# Patient Record
Sex: FEMALE | Race: WHITE | NOT HISPANIC OR LATINO | Employment: OTHER | URBAN - NONMETROPOLITAN AREA
[De-identification: names, ages, dates, MRNs, and addresses within clinical notes are randomized per-mention and may not be internally consistent; named-entity substitution may affect disease eponyms.]

---

## 2017-02-02 ENCOUNTER — OFFICE VISIT (OUTPATIENT)
Dept: CARDIOLOGY | Facility: CLINIC | Age: 72
End: 2017-02-02

## 2017-02-02 VITALS
DIASTOLIC BLOOD PRESSURE: 74 MMHG | SYSTOLIC BLOOD PRESSURE: 104 MMHG | HEIGHT: 64 IN | HEART RATE: 72 BPM | BODY MASS INDEX: 24.07 KG/M2 | WEIGHT: 141 LBS

## 2017-02-02 DIAGNOSIS — I10 ESSENTIAL HYPERTENSION: ICD-10-CM

## 2017-02-02 DIAGNOSIS — E78.00 HYPERCHOLESTEREMIA: ICD-10-CM

## 2017-02-02 DIAGNOSIS — I25.10 CORONARY ARTERY DISEASE INVOLVING NATIVE CORONARY ARTERY OF NATIVE HEART WITHOUT ANGINA PECTORIS: Primary | ICD-10-CM

## 2017-02-02 DIAGNOSIS — Z79.899 MEDICATION MANAGEMENT: ICD-10-CM

## 2017-02-02 PROCEDURE — 99213 OFFICE O/P EST LOW 20 MIN: CPT | Performed by: NURSE PRACTITIONER

## 2017-02-02 RX ORDER — PRASUGREL 10 MG/1
10 TABLET, FILM COATED ORAL DAILY
Qty: 90 TABLET | Refills: 3 | Status: SHIPPED | OUTPATIENT
Start: 2017-02-02 | End: 2017-05-03

## 2017-02-02 NOTE — PROGRESS NOTES
Chief Complaint   Patient presents with   • Follow-up     denies any cardiac problems.    • Med Refill     refills needed on Effient,  90 days to [facility].    • Labs     Pt brought copy of most recent labs from Aug, to have them rechecked in Feb.        Iker Bravo is a 71 y.o. female history of hypertension, hypercholesterolemia was diagnosed with ischemic heart disease in January 2016 when she presented with chest pain and elevated cardiac enzymes. She was noted to have a significant disease of the diagonal which was stented. Today she comes to the office for a follow up appointment and no cardiac complaints are voiced. She is maintaining her normal activities without problems.     HPI         Cardiac History:    Past Surgical History   Procedure Laterality Date   • Cardiovascular stress test  02/15/2012     Stress- 4 min, 86% THR. BP- 186/82. Septal Ischemia   • Echo - converted  02/24/2012     Echo- EF 65%   • Cardiovascular stress test  12/17/2015     Stress- 8 min 31 sec. 85% THR. Anterior Ischemia   • Echo - converted  12/17/2015     Echo- EF 65%. RVSP- 45 mmHg   • Echo - converted  01/01/2016     Echo- ([facility]. Dr. Marks) EF 65%. RVSP- 36 mmHg   • Cath lab procedure  01/02/2016     Cath- (Dr. Marks)- 99% D1- 2.25x8 JONI       Current Outpatient Prescriptions   Medication Sig Dispense Refill   • acetaminophen (TYLENOL) 325 MG tablet Take 650 mg by mouth every 4 (four) hours as needed for mild pain (1-3).     • alendronate (FOSAMAX) 70 MG tablet Take 70 mg by mouth every 7 days.     • aspirin 81 MG EC tablet Take 81 mg by mouth daily.     • atorvastatin (LIPITOR) 20 MG tablet Take 20 mg by mouth every night.     • carvedilol (COREG) 6.25 MG tablet Take 1 tablet by mouth 2 (two) times a day. 60 tablet 11   • cetirizine (ZyrTEC) 10 MG tablet Take 10 mg by mouth daily.     • cholecalciferol (VITAMIN D3) 1000 UNITS tablet Take 1,000 Units by mouth daily.     • Multiple Vitamin (MULTI  VITAMIN DAILY PO) Take 1 tablet by mouth daily.     • nitroglycerin (NITROSTAT) 0.4 MG SL tablet Place 0.4 mg under the tongue every 5 (five) minutes as needed for chest pain. Take no more than 3 doses in 15 minutes.     • omeprazole (PriLOSEC) 40 MG capsule Take 40 mg by mouth daily.     • prasugrel (EFFIENT) 10 MG tablet Take 1 tablet by mouth Daily for 90 days. 90 tablet 3     No current facility-administered medications for this visit.        Codeine; Compazine [prochlorperazine edisylate]; Penicillins; and Sulfa antibiotics    Past Medical History   Diagnosis Date   • GERD (gastroesophageal reflux disease)    • H/O colonoscopy with polypectomy    • History of back surgery    • History of breast biopsy      benign   • Hypercholesteremia    • Osteoporosis        Social History     Social History   • Marital status:      Spouse name: N/A   • Number of children: N/A   • Years of education: N/A     Occupational History   • Not on file.     Social History Main Topics   • Smoking status: Never Smoker   • Smokeless tobacco: Never Used   • Alcohol use No   • Drug use: No   • Sexual activity: Not on file     Other Topics Concern   • Not on file     Social History Narrative       Family History   Problem Relation Age of Onset   • Heart failure Mother    • Atrial fibrillation Mother    • Heart attack Maternal Grandfather    • Heart attack Other    • Heart attack Brother 68       Review of Systems   Constitutional: Negative for activity change, appetite change, fatigue and fever.   HENT: Negative for congestion, nosebleeds, sinus pressure and trouble swallowing.    Eyes: Negative for visual disturbance.   Respiratory: Negative for cough, shortness of breath and wheezing.    Cardiovascular: Negative for chest pain, palpitations and leg swelling.   Gastrointestinal: Negative for abdominal distention, abdominal pain, blood in stool and nausea.   Endocrine: Negative for polydipsia, polyphagia and polyuria.  "  Genitourinary: Negative for dysuria and hematuria.   Musculoskeletal: Negative for gait problem and myalgias.   Skin: Negative for color change.   Neurological: Negative for dizziness, syncope, speech difficulty, weakness, light-headedness, numbness and headaches.   Hematological: Bruises/bleeds easily.   Psychiatric/Behavioral: Negative for confusion, dysphoric mood and sleep disturbance.       Diabetes- No  Thyroid-normal    Objective     Visit Vitals   • /74   • Pulse 72   • Ht 64\" (162.6 cm)   • Wt 141 lb (64 kg)   • BMI 24.2 kg/m2       Physical Exam   Constitutional: She is oriented to person, place, and time. Vital signs are normal. She appears well-developed.   Eyes: Pupils are equal, round, and reactive to light.   Neck: Neck supple. No JVD present. Carotid bruit is not present.   Cardiovascular: Normal rate, regular rhythm, S1 normal, S2 normal and normal pulses.    Pulmonary/Chest: Effort normal and breath sounds normal.   Abdominal: Soft. Bowel sounds are normal.   Musculoskeletal: She exhibits no edema.   Neurological: She is alert and oriented to person, place, and time.   Skin: Skin is warm and dry.   Psychiatric: She has a normal mood and affect. Her behavior is normal. Thought content normal.   Vitals reviewed.    Procedures      Assessment/Plan      Fidelina was seen today for follow-up, med refill and labs.    Diagnoses and all orders for this visit:    Coronary artery disease involving native coronary artery of native heart without angina pectoris    Hypercholesteremia    Essential hypertension    Medication management    Other orders  -     prasugrel (EFFIENT) 10 MG tablet; Take 1 tablet by mouth Daily for 90 days.      She remains asymptomatic and vital signs are stable. She is tolerating Effient well and co-pay is affordable. We will continue same cardiac medications. If she develops more bruising or co-pay increases we can change to Plavix. She follows with you for management of labs. " July labs show lipids well controlled. I encouraged her on diet and handout information given on lipid lowering diet, including mediterranean diet. I encouraged her to maintain physical activity. We will see her back in 6 months or sooner for problems.             Electronically signed by IZZY Powell,  February 2, 2017 2:41 PM

## 2017-05-16 ENCOUNTER — TELEPHONE (OUTPATIENT)
Dept: CARDIOLOGY | Facility: CLINIC | Age: 72
End: 2017-05-16

## 2017-08-07 ENCOUNTER — OFFICE VISIT (OUTPATIENT)
Dept: CARDIOLOGY | Facility: CLINIC | Age: 72
End: 2017-08-07

## 2017-08-07 VITALS
WEIGHT: 138 LBS | HEART RATE: 60 BPM | BODY MASS INDEX: 23.56 KG/M2 | HEIGHT: 64 IN | DIASTOLIC BLOOD PRESSURE: 70 MMHG | SYSTOLIC BLOOD PRESSURE: 110 MMHG

## 2017-08-07 DIAGNOSIS — K21.9 GASTROESOPHAGEAL REFLUX DISEASE WITHOUT ESOPHAGITIS: ICD-10-CM

## 2017-08-07 DIAGNOSIS — I25.9 IHD (ISCHEMIC HEART DISEASE): Primary | ICD-10-CM

## 2017-08-07 DIAGNOSIS — E78.49 OTHER HYPERLIPIDEMIA: ICD-10-CM

## 2017-08-07 PROBLEM — E78.5 HYPERLIPEMIA: Status: ACTIVE | Noted: 2017-08-07

## 2017-08-07 PROCEDURE — 99213 OFFICE O/P EST LOW 20 MIN: CPT | Performed by: NURSE PRACTITIONER

## 2017-08-07 RX ORDER — PRASUGREL 10 MG/1
10 TABLET, FILM COATED ORAL DAILY
COMMUNITY
End: 2017-08-07 | Stop reason: HOSPADM

## 2017-08-07 RX ORDER — RANITIDINE 150 MG/1
150 TABLET ORAL 2 TIMES DAILY
COMMUNITY
End: 2020-11-17 | Stop reason: ALTCHOICE

## 2017-08-07 NOTE — PROGRESS NOTES
Chief Complaint   Patient presents with   • Follow-up     6 month follow-up, labs and med refills per PCP,  patient brought medication list in with visit.       Cardiac Complaints  none      Subjective   Fidelina Bravo is a 71 y.o. female history of hypertension, hypercholesterolemia was diagnosed with ischemic heart disease in January 2016 when she presented with chest pain and elevated cardiac enzymes. She was noted to have a significant disease of the diagonal which was stented. Today she comes to the office for a follow up appointment and no cardiac complaints are voiced.  She says she stays busy at home without concerns.  She walks about 2 miles a day and states that her walking includes hills which she says she can climb without problems.  Labs and refills she reports with PCP, she will have repeat blood work in one week with him.        Cardiac History  Past Surgical History:   Procedure Laterality Date   • CARDIOVASCULAR STRESS TEST  02/15/2012    Stress- 4 min, 86% THR. BP- 186/82. Septal Ischemia   • CARDIOVASCULAR STRESS TEST  12/17/2015    Stress- 8 min 31 sec. 85% THR. Anterior Ischemia   • CATH LAB PROCEDURE  01/02/2016    Cath- (Dr. Marks)- 99% D1- 2.25x8 JONI   • ECHO - CONVERTED  02/24/2012    Echo- EF 65%   • ECHO - CONVERTED  12/17/2015    Echo- EF 65%. RVSP- 45 mmHg   • ECHO - CONVERTED  01/01/2016    Echo- ([facility]. Dr. Marks) EF 65%. RVSP- 36 mmHg       Current Outpatient Prescriptions   Medication Sig Dispense Refill   • acetaminophen (TYLENOL) 325 MG tablet Take 650 mg by mouth every 4 (four) hours as needed for mild pain (1-3).     • aspirin 81 MG EC tablet Take 81 mg by mouth daily.     • atorvastatin (LIPITOR) 20 MG tablet Take 20 mg by mouth every night.     • carvedilol (COREG) 6.25 MG tablet Take 1 tablet by mouth 2 (two) times a day. 60 tablet 11   • cetirizine (ZyrTEC) 10 MG tablet Take 10 mg by mouth daily.     • cholecalciferol (VITAMIN D3) 1000 UNITS tablet Take 1,000 Units by  mouth daily.     • Multiple Vitamin (MULTI VITAMIN DAILY PO) Take 1 tablet by mouth daily.     • nitroglycerin (NITROSTAT) 0.4 MG SL tablet Place 0.4 mg under the tongue every 5 (five) minutes as needed for chest pain. Take no more than 3 doses in 15 minutes.     • raNITIdine (ZANTAC) 150 MG tablet Take 150 mg by mouth 2 (Two) Times a Day.       No current facility-administered medications for this visit.        Codeine; Compazine [prochlorperazine edisylate]; Penicillins; and Sulfa antibiotics    Past Medical History:   Diagnosis Date   • GERD (gastroesophageal reflux disease)    • H/O colonoscopy with polypectomy    • History of back surgery    • History of breast biopsy     benign   • Hypercholesteremia    • Osteoporosis        Social History     Social History   • Marital status:      Spouse name: N/A   • Number of children: N/A   • Years of education: N/A     Occupational History   • Not on file.     Social History Main Topics   • Smoking status: Never Smoker   • Smokeless tobacco: Never Used   • Alcohol use No   • Drug use: No   • Sexual activity: Not on file     Other Topics Concern   • Not on file     Social History Narrative       Family History   Problem Relation Age of Onset   • Heart failure Mother    • Atrial fibrillation Mother    • Heart attack Maternal Grandfather    • Heart attack Other    • Heart attack Brother 68       Review of Systems   Constitution: Negative for weakness and malaise/fatigue.   Cardiovascular: Negative for chest pain, dyspnea on exertion, irregular heartbeat, leg swelling and palpitations.   Musculoskeletal: Negative for arthritis and back pain.   Gastrointestinal: Negative for anorexia, heartburn and nausea.   Genitourinary: Negative for dysuria, hesitancy and nocturia.   Neurological: Negative for dizziness, focal weakness and light-headedness.   Psychiatric/Behavioral: Negative for altered mental status and depression.       DiabetesNo  Thyroidnormal    Objective  "    /70 (BP Location: Left arm)  Pulse 60  Ht 64\" (162.6 cm)  Wt 138 lb (62.6 kg)  BMI 23.69 kg/m2    Physical Exam   Constitutional: She is oriented to person, place, and time. She appears well-developed and well-nourished.   HENT:   Head: Normocephalic and atraumatic.   Eyes: EOM are normal. Pupils are equal, round, and reactive to light.   Neck: Normal range of motion. Neck supple.   Cardiovascular: Normal rate and regular rhythm.    Pulmonary/Chest: Effort normal and breath sounds normal.   Abdominal: Soft.   Musculoskeletal: Normal range of motion.   Neurological: She is alert and oriented to person, place, and time.   Skin: Skin is warm and dry.   Psychiatric: She has a normal mood and affect. Her behavior is normal.       Procedures    Assessment/Plan     HR and BP are both stable.  We will advise to stop her effient therapy since it has been 18 months since stenting, aspirin therapy will be continued. No new cardiac workup will be advised as no new concerns are voiced and she walks daily without problems. No refills are needed as they are done with you.  Labs are done with you also, she reports more will be done next week, she will bring copy to by our office.  Good cardiac diet and continued daily walking regimen advised. 6 month follow up advised or sooner if needed.      Problems Addressed this Visit        Cardiovascular and Mediastinum    Hyperlipemia    IHD (ischemic heart disease) - Primary       Digestive    Gastroesophageal reflux disease without esophagitis    Relevant Medications    raNITIdine (ZANTAC) 150 MG tablet              Electronically signed by IZZY Olvera August 7, 2017 4:06 PM        "

## 2017-08-24 ENCOUNTER — TELEPHONE (OUTPATIENT)
Dept: CARDIOLOGY | Facility: CLINIC | Age: 72
End: 2017-08-24

## 2017-08-24 RX ORDER — CARVEDILOL 6.25 MG/1
6.25 TABLET ORAL 2 TIMES DAILY
Qty: 60 TABLET | Refills: 11 | Status: SHIPPED | OUTPATIENT
Start: 2017-08-24 | End: 2020-06-26 | Stop reason: DRUGHIGH

## 2017-09-11 ENCOUNTER — APPOINTMENT (OUTPATIENT)
Dept: WOMENS IMAGING | Facility: HOSPITAL | Age: 72
End: 2017-09-11

## 2017-09-11 PROCEDURE — 77063 BREAST TOMOSYNTHESIS BI: CPT | Performed by: RADIOLOGY

## 2017-09-11 PROCEDURE — 77067 SCR MAMMO BI INCL CAD: CPT | Performed by: RADIOLOGY

## 2018-02-08 ENCOUNTER — OFFICE VISIT (OUTPATIENT)
Dept: CARDIOLOGY | Facility: CLINIC | Age: 73
End: 2018-02-08

## 2018-02-08 VITALS
SYSTOLIC BLOOD PRESSURE: 122 MMHG | BODY MASS INDEX: 24.41 KG/M2 | DIASTOLIC BLOOD PRESSURE: 88 MMHG | HEART RATE: 64 BPM | HEIGHT: 64 IN | WEIGHT: 143 LBS

## 2018-02-08 DIAGNOSIS — I25.10 CORONARY ARTERY DISEASE INVOLVING NATIVE CORONARY ARTERY OF NATIVE HEART WITHOUT ANGINA PECTORIS: ICD-10-CM

## 2018-02-08 DIAGNOSIS — I25.9 IHD (ISCHEMIC HEART DISEASE): Primary | ICD-10-CM

## 2018-02-08 DIAGNOSIS — Z79.899 MEDICATION MANAGEMENT: ICD-10-CM

## 2018-02-08 DIAGNOSIS — E78.00 PURE HYPERCHOLESTEROLEMIA: ICD-10-CM

## 2018-02-08 PROCEDURE — 99213 OFFICE O/P EST LOW 20 MIN: CPT | Performed by: NURSE PRACTITIONER

## 2018-02-08 NOTE — PATIENT INSTRUCTIONS
"DASH Eating Plan  DASH stands for \"Dietary Approaches to Stop Hypertension.\" The DASH eating plan is a healthy eating plan that has been shown to reduce high blood pressure (hypertension). Additional health benefits may include reducing the risk of type 2 diabetes mellitus, heart disease, and stroke. The DASH eating plan may also help with weight loss.  What do I need to know about the DASH eating plan?  For the DASH eating plan, you will follow these general guidelines:  · Choose foods with less than 150 milligrams of sodium per serving (as listed on the food label).  · Use salt-free seasonings or herbs instead of table salt or sea salt.  · Check with your health care provider or pharmacist before using salt substitutes.  · Eat lower-sodium products. These are often labeled as \"low-sodium\" or \"no salt added.\"  · Eat fresh foods. Avoid eating a lot of canned foods.  · Eat more vegetables, fruits, and low-fat dairy products.  · Choose whole grains. Look for the word \"whole\" as the first word in the ingredient list.  · Choose fish and skinless chicken or turkey more often than red meat. Limit fish, poultry, and meat to 6 oz (170 g) each day.  · Limit sweets, desserts, sugars, and sugary drinks.  · Choose heart-healthy fats.  · Eat more home-cooked food and less restaurant, buffet, and fast food.  · Limit fried foods.  · Do not beebe foods. Cook foods using methods such as baking, boiling, grilling, and broiling instead.  · When eating at a restaurant, ask that your food be prepared with less salt, or no salt if possible.  What foods can I eat?  Seek help from a dietitian for individual calorie needs.  Grains   Whole grain or whole wheat bread. Brown rice. Whole grain or whole wheat pasta. Quinoa, bulgur, and whole grain cereals. Low-sodium cereals. Corn or whole wheat flour tortillas. Whole grain cornbread. Whole grain crackers. Low-sodium crackers.  Vegetables   Fresh or frozen vegetables (raw, steamed, roasted, or " grilled). Low-sodium or reduced-sodium tomato and vegetable juices. Low-sodium or reduced-sodium tomato sauce and paste. Low-sodium or reduced-sodium canned vegetables.  Fruits   All fresh, canned (in natural juice), or frozen fruits.  Meat and Other Protein Products   Ground beef (85% or leaner), grass-fed beef, or beef trimmed of fat. Skinless chicken or turkey. Ground chicken or turkey. Pork trimmed of fat. All fish and seafood. Eggs. Dried beans, peas, or lentils. Unsalted nuts and seeds. Unsalted canned beans.  Dairy   Low-fat dairy products, such as skim or 1% milk, 2% or reduced-fat cheeses, low-fat ricotta or cottage cheese, or plain low-fat yogurt. Low-sodium or reduced-sodium cheeses.  Fats and Oils   Tub margarines without trans fats. Light or reduced-fat mayonnaise and salad dressings (reduced sodium). Avocado. Safflower, olive, or canola oils. Natural peanut or almond butter.  Other   Unsalted popcorn and pretzels.  The items listed above may not be a complete list of recommended foods or beverages. Contact your dietitian for more options.   What foods are not recommended?  Grains   White bread. White pasta. White rice. Refined cornbread. Bagels and croissants. Crackers that contain trans fat.  Vegetables   Creamed or fried vegetables. Vegetables in a cheese sauce. Regular canned vegetables. Regular canned tomato sauce and paste. Regular tomato and vegetable juices.  Fruits   Canned fruit in light or heavy syrup. Fruit juice.  Meat and Other Protein Products   Fatty cuts of meat. Ribs, chicken wings, lozoya, sausage, bologna, salami, chitterlings, fatback, hot dogs, bratwurst, and packaged luncheon meats. Salted nuts and seeds. Canned beans with salt.  Dairy   Whole or 2% milk, cream, half-and-half, and cream cheese. Whole-fat or sweetened yogurt. Full-fat cheeses or blue cheese. Nondairy creamers and whipped toppings. Processed cheese, cheese spreads, or cheese curds.  Condiments   Onion and garlic  salt, seasoned salt, table salt, and sea salt. Canned and packaged gravies. Worcestershire sauce. Tartar sauce. Barbecue sauce. Teriyaki sauce. Soy sauce, including reduced sodium. Steak sauce. Fish sauce. Oyster sauce. Cocktail sauce. Horseradish. Ketchup and mustard. Meat flavorings and tenderizers. Bouillon cubes. Hot sauce. Tabasco sauce. Marinades. Taco seasonings. Relishes.  Fats and Oils   Butter, stick margarine, lard, shortening, ghee, and lozoya fat. Coconut, palm kernel, or palm oils. Regular salad dressings.  Other   Pickles and olives. Salted popcorn and pretzels.  The items listed above may not be a complete list of foods and beverages to avoid. Contact your dietitian for more information.   Where can I find more information?  [employer] [place]: www.nhlbi.nih.gov/health/health-topics/topics/dash/  This information is not intended to replace advice given to you by your health care provider. Make sure you discuss any questions you have with your health care provider.  Document Released: 12/06/2012 Document Revised: 05/25/2017 Document Reviewed: 10/22/2014  Elsevier Interactive Patient Education © 2017 [employer] Inc[employer]

## 2018-02-08 NOTE — PROGRESS NOTES
Chief Complaint   Patient presents with   • Follow-up     cardiac management, no recent labs, patient brought medication list with visit.        Subjective       Fidelina Bravo is a 72 y.o. female history of hypertension, hypercholesterolemia was diagnosed with ischemic heart disease in January 2016 when she presented with chest pain and elevated cardiac enzymes. She was noted to have a significant disease of the diagonal which was stented.   Today she comes to the office for a follow up visit and denies any reoccurrence of symptoms she experienced prior to stenting. She continues to exercise and maintain active lifestyle. No medication changes reported.   HPI         Cardiac History:    Past Surgical History:   Procedure Laterality Date   • CARDIOVASCULAR STRESS TEST  02/15/2012    Stress- 4 min, 86% THR. BP- 186/82. Septal Ischemia   • CARDIOVASCULAR STRESS TEST  12/17/2015    Stress- 8 min 31 sec. 85% THR. Anterior Ischemia   • CATH LAB PROCEDURE  01/02/2016    Cath- (Dr. Marks)- 99% D1- 2.25x8 JONI   • ECHO - CONVERTED  02/24/2012    Echo- EF 65%   • ECHO - CONVERTED  12/17/2015    Echo- EF 65%. RVSP- 45 mmHg   • ECHO - CONVERTED  01/01/2016    Echo- ([facility]. Dr. Marks) EF 65%. RVSP- 36 mmHg       Current Outpatient Prescriptions   Medication Sig Dispense Refill   • acetaminophen (TYLENOL) 325 MG tablet Take 650 mg by mouth every 4 (four) hours as needed for mild pain (1-3).     • aspirin 81 MG EC tablet Take 81 mg by mouth daily.     • atorvastatin (LIPITOR) 20 MG tablet Take 20 mg by mouth every night.     • carvedilol (COREG) 6.25 MG tablet Take 1 tablet by mouth 2 (Two) Times a Day. 60 tablet 11   • cetirizine (ZyrTEC) 10 MG tablet Take 10 mg by mouth daily.     • cholecalciferol (VITAMIN D3) 1000 UNITS tablet Take 1,000 Units by mouth daily.     • Multiple Vitamin (MULTI VITAMIN DAILY PO) Take 1 tablet by mouth daily.     • nitroglycerin (NITROSTAT) 0.4 MG SL tablet Place 0.4 mg under the tongue every 5  (five) minutes as needed for chest pain. Take no more than 3 doses in 15 minutes.     • raNITIdine (ZANTAC) 150 MG tablet Take 150 mg by mouth 2 (Two) Times a Day.       No current facility-administered medications for this visit.        Codeine; Compazine [prochlorperazine edisylate]; Penicillins; and Sulfa antibiotics    Past Medical History:   Diagnosis Date   • GERD (gastroesophageal reflux disease)    • H/O colonoscopy with polypectomy    • History of back surgery    • History of breast biopsy     benign   • Hypercholesteremia    • Osteoporosis        Social History     Social History   • Marital status:      Spouse name: N/A   • Number of children: N/A   • Years of education: N/A     Occupational History   • Not on file.     Social History Main Topics   • Smoking status: Never Smoker   • Smokeless tobacco: Never Used   • Alcohol use No   • Drug use: No   • Sexual activity: Not on file     Other Topics Concern   • Not on file     Social History Narrative       Family History   Problem Relation Age of Onset   • Heart failure Mother    • Atrial fibrillation Mother    • Heart attack Maternal Grandfather    • Heart attack Other    • Heart attack Brother 68       Review of Systems   Constitution: Negative for decreased appetite, weakness and malaise/fatigue.   HENT: Negative for congestion and hoarse voice.    Cardiovascular: Negative for chest pain, leg swelling and palpitations.   Respiratory: Negative for cough and shortness of breath.    Skin: Negative for color change and itching.   Musculoskeletal: Negative for muscle cramps and myalgias.   Gastrointestinal: Negative for bloating, change in bowel habit and melena.   Genitourinary: Negative for dysuria and hematuria.   Neurological: Negative for dizziness, light-headedness and tremors.   Psychiatric/Behavioral: Negative for altered mental status. The patient does not have insomnia and is not nervous/anxious.    Allergic/Immunologic: Negative for hives.  "       Diabetes- No  Thyroid-normal    Objective     /88 (BP Location: Left arm)  Pulse 64  Ht 162.6 cm (64\")  Wt 64.9 kg (143 lb)  BMI 24.55 kg/m2    Physical Exam   Constitutional: She is oriented to person, place, and time. She appears well-nourished.   HENT:   Head: Normocephalic.   Eyes: Conjunctivae are normal. Pupils are equal, round, and reactive to light.   Neck: Normal range of motion. No JVD present.   Cardiovascular: Normal rate, regular rhythm, S1 normal and S2 normal.    No murmur heard.  Pulses:       Radial pulses are 2+ on the right side, and 2+ on the left side.   Pulmonary/Chest: Effort normal and breath sounds normal. No respiratory distress.   Abdominal: Soft. Bowel sounds are normal.   Musculoskeletal: Normal range of motion. She exhibits no edema.   Neurological: She is alert and oriented to person, place, and time.   Skin: Skin is warm and dry.   Psychiatric: She has a normal mood and affect. Her behavior is normal.      Procedures          Assessment/Plan      Fidelina was seen today for follow-up.    Diagnoses and all orders for this visit:    IHD (ischemic heart disease)    Coronary artery disease involving native coronary artery of native heart without angina pectoris    Pure hypercholesterolemia    Medication management        At next visit will plan to order repeat cardiac workup unless problems develop sooner. Her diastolic blood pressure is slightly higher today which she attributes to not watching diet as closely lately and stress of  recent health issue, bladder cancer. DASH diet information given. She will be seeing you in the near future for labs and follow up. At this time I did not make any medication changes, no refills needed. Her weight is up a few pounds but BMI remains normal. She is taking Lipitor for cholesterol management without problem. Continue the same.   A 6 month follow up scheduled. Please call sooner for problems.            Electronically signed " by Ale Rebollar, APRN,  February 8, 2018 12:24 PM

## 2018-08-09 ENCOUNTER — OFFICE VISIT (OUTPATIENT)
Dept: CARDIOLOGY | Facility: CLINIC | Age: 73
End: 2018-08-09

## 2018-08-09 VITALS
BODY MASS INDEX: 24.07 KG/M2 | HEIGHT: 64 IN | WEIGHT: 141 LBS | DIASTOLIC BLOOD PRESSURE: 88 MMHG | HEART RATE: 60 BPM | SYSTOLIC BLOOD PRESSURE: 142 MMHG

## 2018-08-09 DIAGNOSIS — Z79.899 MEDICATION MANAGEMENT: ICD-10-CM

## 2018-08-09 DIAGNOSIS — I25.118 CORONARY ARTERY DISEASE INVOLVING NATIVE CORONARY ARTERY OF NATIVE HEART WITH OTHER FORM OF ANGINA PECTORIS (HCC): Primary | ICD-10-CM

## 2018-08-09 DIAGNOSIS — Z82.49 FAMILY HISTORY OF ISCHEMIC HEART DISEASE: ICD-10-CM

## 2018-08-09 DIAGNOSIS — I10 ESSENTIAL HYPERTENSION: ICD-10-CM

## 2018-08-09 DIAGNOSIS — I25.9 IHD (ISCHEMIC HEART DISEASE): ICD-10-CM

## 2018-08-09 DIAGNOSIS — E78.00 PURE HYPERCHOLESTEROLEMIA: ICD-10-CM

## 2018-08-09 DIAGNOSIS — R06.02 SHORTNESS OF BREATH: ICD-10-CM

## 2018-08-09 PROCEDURE — 99214 OFFICE O/P EST MOD 30 MIN: CPT | Performed by: NURSE PRACTITIONER

## 2018-08-09 RX ORDER — MELOXICAM 15 MG/1
15 TABLET ORAL AS NEEDED
COMMUNITY

## 2018-08-09 NOTE — PROGRESS NOTES
"Chief Complaint   Patient presents with   • Follow-up     For cardiac management. Will have labs per PCP next month. PCP refills meds.        Subjective       Fidelina Bravo is a 72 y.o. female  history of hypertension, hypercholesterolemia was diagnosed with ischemic heart disease in January 2016 when she presented with chest pain and elevated cardiac enzymes. She was noted to have a significant disease of the diagonal which was stented.  Today she comes to the office for follow up visit. She has been under more stress with  undergoing chemotherapy and illness in family. Blood pressure has been increased lately. She has not had issue with chest pain. Occasionally, she feels a \"catch\" type sensation in her chest. She has been maintaining her normal activities and no recent medication changes reported.     HPI     Cardiac History:    Past Surgical History:   Procedure Laterality Date   • CARDIOVASCULAR STRESS TEST  02/15/2012    Stress- 4 min, 86% THR. BP- 186/82. Septal Ischemia   • CARDIOVASCULAR STRESS TEST  12/17/2015    Stress- 8 min 31 sec. 85% THR. Anterior Ischemia   • CATH LAB PROCEDURE  01/02/2016    Cath- (Dr. Marks)- 99% D1- 2.25x8 JONI   • ECHO - CONVERTED  02/24/2012    Echo- EF 65%   • ECHO - CONVERTED  12/17/2015    Echo- EF 65%. RVSP- 45 mmHg   • ECHO - CONVERTED  01/01/2016    Echo- ([facility]. Dr. Marks) EF 65%. RVSP- 36 mmHg       Current Outpatient Prescriptions   Medication Sig Dispense Refill   • acetaminophen (TYLENOL) 325 MG tablet Take 650 mg by mouth every 4 (four) hours as needed for mild pain (1-3).     • aspirin 81 MG EC tablet Take 81 mg by mouth daily.     • atorvastatin (LIPITOR) 20 MG tablet Take 20 mg by mouth every night.     • carvedilol (COREG) 6.25 MG tablet Take 1 tablet by mouth 2 (Two) Times a Day. 60 tablet 11   • cetirizine (ZyrTEC) 10 MG tablet Take 10 mg by mouth daily.     • cholecalciferol (VITAMIN D3) 1000 UNITS tablet Take 1,000 Units by mouth daily.     • " "meloxicam (MOBIC) 15 MG tablet Take 15 mg by mouth As Needed.     • Multiple Vitamin (MULTI VITAMIN DAILY PO) Take 1 tablet by mouth daily.     • nitroglycerin (NITROSTAT) 0.4 MG SL tablet Place 0.4 mg under the tongue every 5 (five) minutes as needed for chest pain. Take no more than 3 doses in 15 minutes.     • raNITIdine (ZANTAC) 150 MG tablet Take 150 mg by mouth 2 (Two) Times a Day.       No current facility-administered medications for this visit.        Codeine; Compazine [prochlorperazine edisylate]; Penicillins; and Sulfa antibiotics    Past Medical History:   Diagnosis Date   • GERD (gastroesophageal reflux disease)    • H/O colonoscopy with polypectomy    • History of back surgery    • History of breast biopsy     benign   • Hypercholesteremia    • Osteoporosis        Social History     Social History   • Marital status:      Spouse name: N/A   • Number of children: N/A   • Years of education: N/A     Occupational History   • Not on file.     Social History Main Topics   • Smoking status: Never Smoker   • Smokeless tobacco: Never Used   • Alcohol use No   • Drug use: No   • Sexual activity: Not on file     Other Topics Concern   • Not on file     Social History Narrative   • No narrative on file       Family History   Problem Relation Age of Onset   • Heart failure Mother    • Atrial fibrillation Mother    • Heart attack Maternal Grandfather    • Heart attack Other    • Heart attack Brother 68       Review of Systems   HENT: Positive for congestion (allergies). Negative for hoarse voice and nosebleeds.    Eyes: Negative for redness and visual disturbance.   Cardiovascular: Positive for palpitations (rarely).   Respiratory: Positive for shortness of breath (sometimes feel like I have to \"catch my breath\"). Negative for sleep disturbances due to breathing.    Skin: Negative for dry skin and itching.   Genitourinary: Negative for dysuria and hematuria.   Neurological: Negative for dizziness, " "headaches, light-headedness and loss of balance.   Psychiatric/Behavioral: The patient is nervous/anxious (under a lot of family stress). The patient does not have insomnia.         Objective     /88   Pulse 60   Ht 162.6 cm (64\")   Wt 64 kg (141 lb)   BMI 24.20 kg/m²     Physical Exam   Constitutional: She is oriented to person, place, and time. Vital signs are normal. She appears well-developed and well-nourished. She does not appear ill. No distress.   HENT:   Head: Normocephalic.   Eyes: Pupils are equal, round, and reactive to light. Conjunctivae are normal.   Neck: Normal range of motion. Neck supple. Carotid bruit is not present.   Cardiovascular: Normal rate, regular rhythm, S1 normal and S2 normal.    No murmur heard.  Pulmonary/Chest: Effort normal and breath sounds normal.   Abdominal: Soft. Bowel sounds are normal.   Musculoskeletal: Normal range of motion. She exhibits no edema.   Neurological: She is alert and oriented to person, place, and time.   Skin: Skin is warm and dry.   Psychiatric: She has a normal mood and affect.        Procedures: none today      Assessment/Plan      Fidelina was seen today for follow-up.    Diagnoses and all orders for this visit:    Coronary artery disease involving native coronary artery of native heart with other form of angina pectoris (CMS/HCC)  -     Stress Test With Myocardial Perfusion One Day; Future  -     Adult Transthoracic Echo Complete W/ Cont if Necessary Per Protocol; Future    IHD (ischemic heart disease)  -     Stress Test With Myocardial Perfusion One Day; Future  -     Adult Transthoracic Echo Complete W/ Cont if Necessary Per Protocol; Future    Essential hypertension  -     Stress Test With Myocardial Perfusion One Day; Future  -     Adult Transthoracic Echo Complete W/ Cont if Necessary Per Protocol; Future    Pure hypercholesterolemia  -     Stress Test With Myocardial Perfusion One Day; Future  -     Adult Transthoracic Echo Complete W/ " Cont if Necessary Per Protocol; Future    Shortness of breath  -     Stress Test With Myocardial Perfusion One Day; Future  -     Adult Transthoracic Echo Complete W/ Cont if Necessary Per Protocol; Future    Medication management    Family history of ischemic heart disease    Fidelina admits to blood pressure being slightly increased when checked lately. She has developed some mild symptoms suggestive of ischemia. Given her known CAD, risk factors and strong family history of heart disease, repeat stress and echo advised. I did not add a medication at this time. If BP response is increased, consider adding ACE inhibitor.     She continues statin therapy without issue and will follow with you for management of labs.     Patient's Body mass index is 24.2 kg/m². BMI is within normal parameters. No follow-up required. Low fat heart healthy encouraged.      Further recommendations based on test results. A 6 month follow up visit scheduled. Please call sooner for cardiac concerns.            Electronically signed by IZZY Powell,  August 13, 2018 12:13 PM

## 2018-08-15 ENCOUNTER — HOSPITAL ENCOUNTER (OUTPATIENT)
Dept: CARDIOLOGY | Facility: HOSPITAL | Age: 73
Discharge: HOME | End: 2018-08-15

## 2018-08-15 DIAGNOSIS — I25.118 CORONARY ARTERY DISEASE INVOLVING NATIVE CORONARY ARTERY OF NATIVE HEART WITH OTHER FORM OF ANGINA PECTORIS (HCC): ICD-10-CM

## 2018-08-15 DIAGNOSIS — I25.9 IHD (ISCHEMIC HEART DISEASE): ICD-10-CM

## 2018-08-15 DIAGNOSIS — E78.00 PURE HYPERCHOLESTEROLEMIA: ICD-10-CM

## 2018-08-15 DIAGNOSIS — R06.02 SHORTNESS OF BREATH: ICD-10-CM

## 2018-08-15 DIAGNOSIS — I10 ESSENTIAL HYPERTENSION: ICD-10-CM

## 2018-08-15 LAB
MAXIMAL PREDICTED HEART RATE: 150 BPM
MAXIMAL PREDICTED HEART RATE: 150 BPM
STRESS TARGET HR: 128 BPM
STRESS TARGET HR: 128 BPM

## 2018-08-15 PROCEDURE — 78452 HT MUSCLE IMAGE SPECT MULT: CPT

## 2018-08-15 PROCEDURE — A9500 TC99M SESTAMIBI: HCPCS | Performed by: INTERNAL MEDICINE

## 2018-08-15 PROCEDURE — 93306 TTE W/DOPPLER COMPLETE: CPT | Performed by: INTERNAL MEDICINE

## 2018-08-15 PROCEDURE — 93017 CV STRESS TEST TRACING ONLY: CPT

## 2018-08-15 PROCEDURE — 93306 TTE W/DOPPLER COMPLETE: CPT

## 2018-08-15 PROCEDURE — 78452 HT MUSCLE IMAGE SPECT MULT: CPT | Performed by: INTERNAL MEDICINE

## 2018-08-15 PROCEDURE — 0 TECHNETIUM SESTAMIBI: Performed by: INTERNAL MEDICINE

## 2018-08-15 PROCEDURE — 93018 CV STRESS TEST I&R ONLY: CPT | Performed by: INTERNAL MEDICINE

## 2018-08-15 RX ADMIN — TECHNETIUM TC 99M SESTAMIBI 1 DOSE: 1 INJECTION INTRAVENOUS at 08:42

## 2018-08-15 RX ADMIN — TECHNETIUM TC 99M SESTAMIBI 1 DOSE: 1 INJECTION INTRAVENOUS at 08:41

## 2018-08-17 ENCOUNTER — TELEPHONE (OUTPATIENT)
Dept: CARDIOLOGY | Facility: CLINIC | Age: 73
End: 2018-08-17

## 2018-08-17 RX ORDER — ISOSORBIDE MONONITRATE 30 MG/1
30 TABLET, EXTENDED RELEASE ORAL DAILY
Qty: 30 TABLET | Refills: 11 | Status: SHIPPED | OUTPATIENT
Start: 2018-08-17

## 2018-09-15 ENCOUNTER — APPOINTMENT (OUTPATIENT)
Dept: WOMENS IMAGING | Facility: HOSPITAL | Age: 73
End: 2018-09-15

## 2018-09-15 PROCEDURE — 77063 BREAST TOMOSYNTHESIS BI: CPT | Performed by: RADIOLOGY

## 2018-09-15 PROCEDURE — 77067 SCR MAMMO BI INCL CAD: CPT | Performed by: RADIOLOGY

## 2019-02-07 ENCOUNTER — OFFICE VISIT (OUTPATIENT)
Dept: CARDIOLOGY | Facility: CLINIC | Age: 74
End: 2019-02-07

## 2019-02-07 VITALS
SYSTOLIC BLOOD PRESSURE: 106 MMHG | HEART RATE: 60 BPM | WEIGHT: 145 LBS | DIASTOLIC BLOOD PRESSURE: 70 MMHG | HEIGHT: 64 IN | BODY MASS INDEX: 24.75 KG/M2

## 2019-02-07 DIAGNOSIS — I10 ESSENTIAL HYPERTENSION: ICD-10-CM

## 2019-02-07 DIAGNOSIS — Z82.49 FAMILY HISTORY OF ISCHEMIC HEART DISEASE: ICD-10-CM

## 2019-02-07 DIAGNOSIS — E78.00 PURE HYPERCHOLESTEROLEMIA: ICD-10-CM

## 2019-02-07 DIAGNOSIS — I25.9 IHD (ISCHEMIC HEART DISEASE): Primary | ICD-10-CM

## 2019-02-07 PROBLEM — R94.39 ABNORMAL STRESS TEST: Status: ACTIVE | Noted: 2019-02-07

## 2019-02-07 PROCEDURE — 99213 OFFICE O/P EST LOW 20 MIN: CPT | Performed by: NURSE PRACTITIONER

## 2019-02-07 NOTE — PATIENT INSTRUCTIONS
Mediterranean Diet  A Mediterranean diet refers to food and lifestyle choices that are based on the traditions of countries located on the [place]. This way of eating has been shown to help prevent certain conditions and improve outcomes for people who have chronic diseases, like kidney disease and heart disease.  What are tips for following this plan?  Lifestyle  · Cook and eat meals together with your family, when possible.  · Drink enough fluid to keep your urine clear or pale yellow.  · Be physically active every day. This includes:  ? Aerobic exercise like running or swimming.  ? Leisure activities like gardening, walking, or housework.  · Get 7-8 hours of sleep each night.  · If recommended by your health care provider, drink red wine in moderation. This means 1 glass a day for nonpregnant women and 2 glasses a day for men. A glass of wine equals 5 oz (150 mL).  Reading food labels  · Check the serving size of packaged foods. For foods such as rice and pasta, the serving size refers to the amount of cooked product, not dry.  · Check the total fat in packaged foods. Avoid foods that have saturated fat or trans fats.  · Check the ingredients list for added sugars, such as corn syrup.  Shopping  · At the grocery store, buy most of your food from the areas near the walls of the store. This includes:  ? Fresh fruits and vegetables (produce).  ? Grains, beans, nuts, and seeds. Some of these may be available in unpackaged forms or large amounts (in bulk).  ? Fresh seafood.  ? Poultry and eggs.  ? Low-fat dairy products.  · Buy whole ingredients instead of prepackaged foods.  · Buy fresh fruits and vegetables in-season from local farmers markets.  · Buy frozen fruits and vegetables in resealable bags.  · If you do not have access to quality fresh seafood, buy precooked frozen shrimp or canned fish, such as tuna, salmon, or sardines.  · Buy small amounts of raw or cooked vegetables, salads, or olives from the  deli or salad bar at your store.  · Stock your pantry so you always have certain foods on hand, such as olive oil, canned tuna, canned tomatoes, rice, pasta, and beans.  Cooking  · Cook foods with extra-virgin olive oil instead of using butter or other vegetable oils.  · Have meat as a side dish, and have vegetables or grains as your main dish. This means having meat in small portions or adding small amounts of meat to foods like pasta or stew.  · Use beans or vegetables instead of meat in common dishes like chili or lasagna.  · [place] with different cooking methods. Try roasting or broiling vegetables instead of steaming or sautéeing them.  · Add frozen vegetables to soups, stews, pasta, or rice.  · Add nuts or seeds for added healthy fat at each meal. You can add these to yogurt, salads, or vegetable dishes.  · Marinate fish or vegetables using olive oil, lemon juice, garlic, and fresh herbs.  Meal planning  · Plan to eat 1 vegetarian meal one day each week. Try to work up to 2 vegetarian meals, if possible.  · Eat seafood 2 or more times a week.  · Have healthy snacks readily available, such as:  ? Vegetable sticks with hummus.  ? Greek yogurt.  ? Fruit and nut trail mix.  · Eat balanced meals throughout the week. This includes:  ? Fruit: 2-3 servings a day  ? Vegetables: 4-5 servings a day  ? Low-fat dairy: 2 servings a day  ? Fish, poultry, or lean meat: 1 serving a day  ? Beans and legumes: 2 or more servings a week  ? Nuts and seeds: 1-2 servings a day  ? Whole grains: 6-8 servings a day  ? Extra-virgin olive oil: 3-4 servings a day  · Limit red meat and sweets to only a few servings a month  What are my food choices?  · Mediterranean diet  ? Recommended  ? Grains: Whole-grain pasta. Brown rice. Bulgar wheat. Polenta. Couscous. Whole-wheat bread. Oatmeal. Quinoa.  ? Vegetables: Artichokes. Beets. Broccoli. Cabbage. Carrots. Eggplant. Green beans. Chard. Kale. Spinach. Onions. Leeks. Peas. Squash.  Tomatoes. Peppers. Radishes.  ? Fruits: Apples. Apricots. Avocado. Berries. Bananas. Cherries. Dates. Figs. Grapes. Dari. Melon. Oranges. Peaches. Plums. Pomegranate.  ? Meats and other protein foods: Beans. Almonds. Sunflower seeds. Pine nuts. Peanuts. Cod. [place]. Scallops. Shrimp. Tuna. Tilapia. Clams. Oysters. Eggs.  ? Dairy: Low-fat milk. Cheese. Greek yogurt.  ? Beverages: Water. Red wine. Herbal tea.  ? Fats and oils: Extra virgin olive oil. Avocado oil. Grape seed oil.  ? Sweets and desserts: Greek yogurt with honey. Baked apples. Poached pears. Trail mix.  ? Seasoning and other foods: Basil. Cilantro. Coriander. Cumin. Mint. Parsley. Zaki. Rosemary. Tarragon. Garlic. Oregano. Thyme. Pepper. Balsalmic vinegar. Tahini. Hummus. Tomato sauce. Olives. Mushrooms.  ? Limit these  ? Grains: Prepackaged pasta or rice dishes. Prepackaged cereal with added sugar.  ? Vegetables: Deep fried potatoes (french fries).  ? Fruits: Fruit canned in syrup.  ? Meats and other protein foods: Beef. Pork. Lamb. Poultry with skin. Hot dogs. Muhammad.  ? Dairy: Ice cream. Sour cream. Whole milk.  ? Beverages: Juice. Sugar-sweetened soft drinks. Beer. Liquor and spirits.  ? Fats and oils: Butter. Canola oil. Vegetable oil. Beef fat (tallow). Lard.  ? Sweets and desserts: Cookies. Cakes. Pies. Candy.  ? Seasoning and other foods: Mayonnaise. Premade sauces and marinades.  ? The items listed may not be a complete list. Talk with your dietitian about what dietary choices are right for you.  Summary  · The Mediterranean diet includes both food and lifestyle choices.  · Eat a variety of fresh fruits and vegetables, beans, nuts, seeds, and whole grains.  · Limit the amount of red meat and sweets that you eat.  · Talk with your health care provider about whether it is safe for you to drink red wine in moderation. This means 1 glass a day for nonpregnant women and 2 glasses a day for men. A glass of wine equals 5 oz (150 mL).  This information  is not intended to replace advice given to you by your health care provider. Make sure you discuss any questions you have with your health care provider.  Document Released: 08/10/2017 Document Revised: 09/12/2017 Document Reviewed: 08/10/2017  [employer] Interactive Patient Education © 2018 [employer]

## 2019-02-07 NOTE — PROGRESS NOTES
"Chief Complaint   Patient presents with   • Follow-up     Cardiac management. She states \"have a little tingle to left chest, not often\", she feels related to muscle. Has some heaviness to left breast. Last labs in the fall per PCP.       Subjective       Fidelina Bravo is a 73 y.o. female with a history of hypertension, hypercholesterolemia, and IHD diagnosed in January 2016 when she presented with chest pain and elevated cardiac enzymes. She was noted to have a significant disease of the diagonal which was stented. Stress test was repeated August 2018. Small area of anterior wall ischemia noted. Long-acting nitrates added with plan for cath if she developed angina. She came in today for follow up. Tolerating Imdur. She denies any cardiac symptoms. No chest pain, SOB, or palpitations. She occasionally feels \"a little tingle in left breast\" she relates to fibrocystic breast but no angina. She remains active without symptoms. She walks 2-3 miles daily and follows heart healthy diet. Labs are followed by Dr. Pardo. She reports most recent labs were well controlled with improvement in HDL according to her.      HPI         Cardiac History:    Past Surgical History:   Procedure Laterality Date   • CARDIAC CATHETERIZATION  01/02/2016    Dr. Marks)- 99% D1- 2.25x8 JONI   • CARDIOVASCULAR STRESS TEST  02/15/2012    4 min, 86% THR. BP- 186/82. Septal Ischemia   • CARDIOVASCULAR STRESS TEST  12/17/2015    8 min 31 sec. 85% THR. Anterior Ischemia   • CARDIOVASCULAR STRESS TEST  08/15/2018    7 Min. 20 Secs.9.0 METS. 88% THR. BP- 180/83. Small anterior Ischemia.   • ECHO - CONVERTED  02/24/2012    Echo- EF 65%   • ECHO - CONVERTED  12/17/2015    Echo- EF 65%. RVSP- 45 mmHg   • ECHO - CONVERTED  01/01/2016    Echo- ([facility]. Dr. Marks) EF 65%. RVSP- 36 mmHg   • ECHO - CONVERTED  08/15/2018    EF 65%. Mild MR & AI. RVSP- 37 mmHg.       Current Outpatient Medications   Medication Sig Dispense Refill   • acetaminophen (TYLENOL) " 325 MG tablet Take 650 mg by mouth every 4 (four) hours as needed for mild pain (1-3).     • aspirin 81 MG EC tablet Take 81 mg by mouth daily.     • atorvastatin (LIPITOR) 20 MG tablet Take 20 mg by mouth every night.     • carvedilol (COREG) 6.25 MG tablet Take 1 tablet by mouth 2 (Two) Times a Day. 60 tablet 11   • cetirizine (ZyrTEC) 10 MG tablet Take 10 mg by mouth daily.     • cholecalciferol (VITAMIN D3) 1000 UNITS tablet Take 1,000 Units by mouth daily.     • isosorbide mononitrate (IMDUR) 30 MG 24 hr tablet Take 1 tablet by mouth Daily. 30 tablet 11   • meloxicam (MOBIC) 15 MG tablet Take 15 mg by mouth As Needed.     • Multiple Vitamin (MULTI VITAMIN DAILY PO) Take 1 tablet by mouth daily.     • nitroglycerin (NITROSTAT) 0.4 MG SL tablet Place 0.4 mg under the tongue every 5 (five) minutes as needed for chest pain. Take no more than 3 doses in 15 minutes.     • raNITIdine (ZANTAC) 150 MG tablet Take 150 mg by mouth 2 (Two) Times a Day.       No current facility-administered medications for this visit.        Codeine; Compazine [prochlorperazine edisylate]; Sulfa antibiotics; and Penicillins    Past Medical History:   Diagnosis Date   • GERD (gastroesophageal reflux disease)    • H/O colonoscopy with polypectomy    • History of back surgery    • History of breast biopsy     benign   • Hypercholesteremia    • Osteoporosis        Social History     Socioeconomic History   • Marital status:      Spouse name: Not on file   • Number of children: Not on file   • Years of education: Not on file   • Highest education level: Not on file   Social Needs   • Financial resource strain: Not on file   • Food insecurity - worry: Not on file   • Food insecurity - inability: Not on file   • Transportation needs - medical: Not on file   • Transportation needs - non-medical: Not on file   Occupational History   • Not on file   Tobacco Use   • Smoking status: Never Smoker   • Smokeless tobacco: Never Used   Substance and  "Sexual Activity   • Alcohol use: No   • Drug use: No   • Sexual activity: Not on file   Other Topics Concern   • Not on file   Social History Narrative   • Not on file       Family History   Problem Relation Age of Onset   • Heart failure Mother    • Atrial fibrillation Mother    • Heart attack Maternal Grandfather    • Heart attack Other    • Heart attack Brother 68       Review of Systems   Constitution: Positive for weight gain (increased 4 lb). Negative for decreased appetite and weakness.   HENT: Negative.    Eyes: Negative.    Cardiovascular: Negative for chest pain, dyspnea on exertion, leg swelling, orthopnea, palpitations and syncope.   Respiratory: Negative for cough and shortness of breath.    Endocrine: Negative.    Hematologic/Lymphatic: Negative.    Skin: Negative.    Musculoskeletal: Positive for back pain. Negative for falls and myalgias.   Gastrointestinal: Negative for abdominal pain and melena.   Genitourinary: Negative for dysuria and hematuria.   Neurological: Negative for dizziness.   Psychiatric/Behavioral: Negative.  Negative for altered mental status and depression.   Allergic/Immunologic: Negative.       Diabetes- No  Thyroid-normal    Objective     /70 (BP Location: Right arm)   Pulse 60   Ht 162.6 cm (64.02\")   Wt 65.8 kg (145 lb)   BMI 24.88 kg/m²     Physical Exam   Constitutional: She is oriented to person, place, and time. She appears well-developed and well-nourished.   HENT:   Head: Normocephalic.   Eyes: Pupils are equal, round, and reactive to light.   Neck: Normal range of motion.   Cardiovascular: Normal rate, regular rhythm and intact distal pulses.   Pulmonary/Chest: Effort normal and breath sounds normal. No respiratory distress. She has no wheezes.   Abdominal: Soft. Bowel sounds are normal.   Musculoskeletal: Normal range of motion. She exhibits no edema.   Neurological: She is alert and oriented to person, place, and time.   Skin: Skin is warm and dry. She is not " diaphoretic.   Psychiatric: She has a normal mood and affect.   Nursing note and vitals reviewed.    Procedures          Assessment/Plan    Heart rate and blood pressure are stable. Recent stress showing small anterior ischemia reviewed with her. She is well controlled with long-acting nitrates. Continue Imdur. She is aware to contact office with any change in symptoms and cardiac cath will be recommended. Continue aspirin, statin, and beta blocker. Labs are well controlled. Continue daily walking. Heart healthy diet low in sodium, sugar, and saturated fat. Mediterranean diet provided today. She appears stable. We will see her back in six months or sooner as needed.   Fidelina was seen today for follow-up.    Diagnoses and all orders for this visit:    IHD (ischemic heart disease)    Essential hypertension    Pure hypercholesterolemia    Family history of ischemic heart disease        Patient's Body mass index is 24.88 kg/m². BMI is within normal parameters. No follow-up required..               Electronically signed by IZZY Villasenor,  February 7, 2019 9:35 AM

## 2019-08-13 ENCOUNTER — OFFICE VISIT (OUTPATIENT)
Dept: CARDIOLOGY | Facility: CLINIC | Age: 74
End: 2019-08-13

## 2019-08-13 VITALS
SYSTOLIC BLOOD PRESSURE: 122 MMHG | HEART RATE: 64 BPM | DIASTOLIC BLOOD PRESSURE: 80 MMHG | HEIGHT: 64 IN | WEIGHT: 144 LBS | BODY MASS INDEX: 24.59 KG/M2

## 2019-08-13 DIAGNOSIS — E78.2 MIXED HYPERLIPIDEMIA: ICD-10-CM

## 2019-08-13 DIAGNOSIS — I20.9 ANGINAL SYNDROME (HCC): ICD-10-CM

## 2019-08-13 DIAGNOSIS — R94.39 ABNORMAL STRESS TEST: ICD-10-CM

## 2019-08-13 DIAGNOSIS — I10 ESSENTIAL HYPERTENSION: ICD-10-CM

## 2019-08-13 DIAGNOSIS — I25.9 IHD (ISCHEMIC HEART DISEASE): Primary | ICD-10-CM

## 2019-08-13 PROCEDURE — 99214 OFFICE O/P EST MOD 30 MIN: CPT | Performed by: NURSE PRACTITIONER

## 2019-08-13 RX ORDER — NITROGLYCERIN 0.4 MG/1
0.4 TABLET SUBLINGUAL
Qty: 25 TABLET | Refills: 1 | Status: SHIPPED | OUTPATIENT
Start: 2019-08-13

## 2019-08-13 NOTE — PROGRESS NOTES
Chief Complaint   Patient presents with   • Follow-up     For cardiac management. Patient is on aspirin. Reports that she does have some chest pain. Reports that she had gone walking and came back in and bent down to untie shoe and had chest pain that lasted around 15 minutes was short of breawth then, took an aspirin and sat down and then it went away. Last lab work was done on 04/30/19 per PCP, not in chart.    • Med Refill     Needs refills on nitroglycerin to [facility]. PCP has been refilling rest of medications. Brought medication list with visit.        Cardiac Complaints  none      Subjective   Fidelina Bravo is a 73 y.o. female with a history of hypertension, hypercholesterolemia, and IHD diagnosed in January 2016 when she presented with chest pain and elevated cardiac enzymes. She was noted to have a significant disease of the diagonal which was stented. Stress test was repeated August 2018. Small area of anterior wall ischemia noted. Long-acting nitrates added with plan for cath if she developed angina.    She returns today for follow up and reports some issues with angina about 3-4 months ago, that she describes as atypical.  Patient reports she had been walking 2 miles that morning and came back to her house.  She admits to bending over and tying her shoe and noticing a very sharp pain in her chest which then caused shortness of breath.  Patient admits symptoms lasted for about 15 minutes and it was difficult to take a deep breath as it hurt.  She admits that she took ASA but did not take her NTG and pain slowly went away.  She states that after the event she was able to go back to doing her activities and walking at home without concerns.  She does report at this time she was also battling a URI and thinks pleurisy may have been a factor.  She denies any further issues since that time.  Patient does admit to occasional twinge in her chest that she describes as rare and lasts a few seconds and goes  away on its own.  She denies any change in activity and manages to stay busy without concerns. Shortness of breath is denied, pre-syncope/syncope/dizziness denied. Labs she admits are done with PCP, most recent in April.  No copy available for review.  Refills of NTG requested as PCP is monitoring.        Cardiac History  Past Surgical History:   Procedure Laterality Date   • CARDIAC CATHETERIZATION  01/02/2016    Dr. Marks)- 99% D1- 2.25x8 JONI   • CARDIOVASCULAR STRESS TEST  02/15/2012    4 min, 86% THR. BP- 186/82. Septal Ischemia   • CARDIOVASCULAR STRESS TEST  12/17/2015    8 min 31 sec. 85% THR. Anterior Ischemia   • CARDIOVASCULAR STRESS TEST  08/15/2018    7 Min. 20 Secs.9.0 METS. 88% THR. BP- 180/83. Small anterior Ischemia.   • ECHO - CONVERTED  02/24/2012    Echo- EF 65%   • ECHO - CONVERTED  12/17/2015    Echo- EF 65%. RVSP- 45 mmHg   • ECHO - CONVERTED  01/01/2016    Echo- ([facility]. Dr. Marks) EF 65%. RVSP- 36 mmHg   • ECHO - CONVERTED  08/15/2018    EF 65%. Mild MR & AI. RVSP- 37 mmHg.       Current Outpatient Medications   Medication Sig Dispense Refill   • acetaminophen (TYLENOL) 325 MG tablet Take 650 mg by mouth every 4 (four) hours as needed for mild pain (1-3).     • aspirin 81 MG EC tablet Take 81 mg by mouth daily.     • atorvastatin (LIPITOR) 20 MG tablet Take 20 mg by mouth every night.     • carvedilol (COREG) 6.25 MG tablet Take 1 tablet by mouth 2 (Two) Times a Day. 60 tablet 11   • cetirizine (ZyrTEC) 10 MG tablet Take 10 mg by mouth daily.     • cholecalciferol (VITAMIN D3) 1000 UNITS tablet Take 1,000 Units by mouth daily.     • isosorbide mononitrate (IMDUR) 30 MG 24 hr tablet Take 1 tablet by mouth Daily. 30 tablet 11   • meloxicam (MOBIC) 15 MG tablet Take 15 mg by mouth As Needed.     • Multiple Vitamin (MULTI VITAMIN DAILY PO) Take 1 tablet by mouth daily.     • nitroglycerin (NITROSTAT) 0.4 MG SL tablet Place 1 tablet under the tongue Every 5 (Five) Minutes As Needed for Chest  "Pain. Take no more than 3 doses in 15 minutes. 25 tablet 1   • raNITIdine (ZANTAC) 150 MG tablet Take 150 mg by mouth 2 (Two) Times a Day.       No current facility-administered medications for this visit.        Codeine; Compazine [prochlorperazine edisylate]; Sulfa antibiotics; and Penicillins    Past Medical History:   Diagnosis Date   • GERD (gastroesophageal reflux disease)    • H/O colonoscopy with polypectomy    • History of back surgery    • History of breast biopsy     benign   • Hypercholesteremia    • Osteoporosis        Social History     Socioeconomic History   • Marital status:      Spouse name: Not on file   • Number of children: Not on file   • Years of education: Not on file   • Highest education level: Not on file   Tobacco Use   • Smoking status: Never Smoker   • Smokeless tobacco: Never Used   Substance and Sexual Activity   • Alcohol use: No   • Drug use: No       Family History   Problem Relation Age of Onset   • Heart failure Mother    • Atrial fibrillation Mother    • Heart attack Maternal Grandfather    • Heart attack Other    • Heart attack Brother 68       Review of Systems   Constitution: Negative for weakness.   Cardiovascular: Negative for chest pain, claudication, dyspnea on exertion, irregular heartbeat, near-syncope, orthopnea, palpitations and syncope.        Chest pain 3 months ago with URI, nothing since   Respiratory: Negative for cough, shortness of breath and wheezing.    Musculoskeletal: Negative for back pain, joint pain and joint swelling.   Gastrointestinal: Negative for anorexia, heartburn, nausea and vomiting.   Genitourinary: Negative for dysuria, hematuria, hesitancy and nocturia.   Neurological: Negative for dizziness, light-headedness and loss of balance.   Psychiatric/Behavioral: Negative for depression and memory loss. The patient is not nervous/anxious.            Objective     /80 (BP Location: Left arm)   Pulse 64   Ht 162.6 cm (64.02\")   Wt 65.3 " kg (144 lb)   BMI 24.71 kg/m²     Physical Exam   Constitutional: She is oriented to person, place, and time. She appears well-developed and well-nourished.   HENT:   Head: Normocephalic and atraumatic.   Eyes: EOM are normal. Pupils are equal, round, and reactive to light.   Neck: Normal range of motion. Neck supple.   Cardiovascular: Normal rate and regular rhythm.   Murmur heard.  Pulmonary/Chest: Effort normal and breath sounds normal.   Abdominal: Soft.   Musculoskeletal: Normal range of motion.   Neurological: She is alert and oriented to person, place, and time.   Skin: Skin is warm and dry.   Psychiatric: She has a normal mood and affect. Her behavior is normal.       Procedures    Assessment/Plan     HR and BP are both stable today.  HTN well managed on current, same advised. Angina well managed on current imdur therapy as no chest pain reported over 3-4 months.  Patient does admit to an atypical chest pain 3 months ago with shortness of breath but admits to a bout of bronchitis, description sounds more like a pleurisy.  She was urged to call with any more chest discomfort so cardiac cath can be scheduled.  Patient urged to use SL NTG if needed for pain.  ASA therapy continued for history of IHD.  No recent labs are available but she states you are managing FLP and lipitor therapy.  For now, current dosing continued.  Could we have most recent for review?  No refills needed as she reports with your office. Weight is stable with BMI noted at 24.71.  Patient urged to maintain good cardiac diet and walking regimen of about 1-2 miles per day.  6 month follow up scheduled or sooner if needed.  Patient urged to call for concerns.            Problems Addressed this Visit        Cardiovascular and Mediastinum    Hyperlipemia    IHD (ischemic heart disease) - Primary    Relevant Medications    nitroglycerin (NITROSTAT) 0.4 MG SL tablet    Essential hypertension    Abnormal stress test      Other Visit Diagnoses      Anginal syndrome (CMS/HCC)        Relevant Medications    nitroglycerin (NITROSTAT) 0.4 MG SL tablet          Patient's Body mass index is 24.71 kg/m². BMI is within normal parameters. No follow-up required..                Electronically signed by IZZY Olvera August 13, 2019 11:59 AM

## 2019-09-24 ENCOUNTER — APPOINTMENT (OUTPATIENT)
Dept: WOMENS IMAGING | Facility: HOSPITAL | Age: 74
End: 2019-09-24

## 2019-09-24 PROCEDURE — 77063 BREAST TOMOSYNTHESIS BI: CPT | Performed by: RADIOLOGY

## 2019-09-24 PROCEDURE — 77067 SCR MAMMO BI INCL CAD: CPT | Performed by: RADIOLOGY

## 2020-02-24 ENCOUNTER — OFFICE VISIT (OUTPATIENT)
Dept: CARDIOLOGY | Facility: CLINIC | Age: 75
End: 2020-02-24

## 2020-02-24 VITALS
WEIGHT: 146 LBS | BODY MASS INDEX: 24.92 KG/M2 | SYSTOLIC BLOOD PRESSURE: 110 MMHG | HEART RATE: 72 BPM | HEIGHT: 64 IN | DIASTOLIC BLOOD PRESSURE: 78 MMHG

## 2020-02-24 DIAGNOSIS — R94.39 ABNORMAL STRESS TEST: ICD-10-CM

## 2020-02-24 DIAGNOSIS — E78.2 MIXED HYPERLIPIDEMIA: ICD-10-CM

## 2020-02-24 DIAGNOSIS — I25.9 IHD (ISCHEMIC HEART DISEASE): ICD-10-CM

## 2020-02-24 DIAGNOSIS — I10 ESSENTIAL HYPERTENSION: Primary | ICD-10-CM

## 2020-02-24 PROCEDURE — 99213 OFFICE O/P EST LOW 20 MIN: CPT | Performed by: NURSE PRACTITIONER

## 2020-02-24 NOTE — PROGRESS NOTES
Chief Complaint   Patient presents with   • Follow-up     Cardiac management.   • Lab     Last labs in chart. PCP writes refills on medication.   • Chest Pain     Only had one episode of sharp pain after coughing when she was bending over, short in duration.       Iker Bravo is a 74 y.o. female with hypertension, hypercholesterolemia, and IHD diagnosed in January 2016 when she presented with chest pain and elevated cardiac enzymes. She was noted to have a significant disease of the diagonal which was stented. Stress test was repeated August 2018. Small area of anterior wall ischemia noted. Long-acting nitrates added with plan for cath if she developed angina. She came today for follow up. Overall, feeling well. Maintaining regular activities without symptoms. She had an isolated episode of sharp chest pain which occurred about 3-4 weeks ago when she was bending over. The pain subsided when she raised up. She associated the pain with URI, coughing which has now resolved. No recurrent episodes. Labs and refills are followed by Dr. Pardo. On 9/26/19: , LDL 95, HDL 39, Tri 80, glucose 96, GFR 68, AST 47.     HPI         Cardiac History:    Past Surgical History:   Procedure Laterality Date   • CARDIAC CATHETERIZATION  01/02/2016    Dr. Marks)- 99% D1- 2.25x8 JONI   • CARDIOVASCULAR STRESS TEST  02/15/2012    4 min, 86% THR. BP- 186/82. Septal Ischemia   • CARDIOVASCULAR STRESS TEST  12/17/2015    8 min 31 sec. 85% THR. Anterior Ischemia   • CARDIOVASCULAR STRESS TEST  08/15/2018    7 Min. 20 Secs.9.0 METS. 88% THR. BP- 180/83. Small anterior Ischemia.   • ECHO - CONVERTED  02/24/2012    Echo- EF 65%   • ECHO - CONVERTED  12/17/2015    Echo- EF 65%. RVSP- 45 mmHg   • ECHO - CONVERTED  01/01/2016    Echo- ([facility]. Dr. Marks) EF 65%. RVSP- 36 mmHg   • ECHO - CONVERTED  08/15/2018    EF 65%. Mild MR & AI. RVSP- 37 mmHg.     Current Outpatient Medications   Medication Sig Dispense Refill   •  acetaminophen (TYLENOL) 325 MG tablet Take 650 mg by mouth every 4 (four) hours as needed for mild pain (1-3).     • aspirin 81 MG EC tablet Take 81 mg by mouth daily.     • atorvastatin (LIPITOR) 20 MG tablet Take 20 mg by mouth every night.     • carvedilol (COREG) 6.25 MG tablet Take 1 tablet by mouth 2 (Two) Times a Day. 60 tablet 11   • cetirizine (ZyrTEC) 10 MG tablet Take 10 mg by mouth daily.     • cholecalciferol (VITAMIN D3) 1000 UNITS tablet Take 1,000 Units by mouth daily.     • isosorbide mononitrate (IMDUR) 30 MG 24 hr tablet Take 1 tablet by mouth Daily. 30 tablet 11   • meloxicam (MOBIC) 15 MG tablet Take 15 mg by mouth As Needed.     • Multiple Vitamin (MULTI VITAMIN DAILY PO) Take 1 tablet by mouth daily.     • nitroglycerin (NITROSTAT) 0.4 MG SL tablet Place 1 tablet under the tongue Every 5 (Five) Minutes As Needed for Chest Pain. Take no more than 3 doses in 15 minutes. 25 tablet 1   • raNITIdine (ZANTAC) 150 MG tablet Take 150 mg by mouth 2 (Two) Times a Day.       No current facility-administered medications for this visit.      Compazine [prochlorperazine edisylate]; Codeine; Penicillins; and Sulfa antibiotics    Past Medical History:   Diagnosis Date   • GERD (gastroesophageal reflux disease)    • H/O colonoscopy with polypectomy    • History of back surgery    • History of breast biopsy     benign   • Hypercholesteremia    • Osteoporosis        Social History     Socioeconomic History   • Marital status:      Spouse name: Not on file   • Number of children: Not on file   • Years of education: Not on file   • Highest education level: Not on file   Tobacco Use   • Smoking status: Never Smoker   • Smokeless tobacco: Never Used   Substance and Sexual Activity   • Alcohol use: No   • Drug use: No       Family History   Problem Relation Age of Onset   • Heart failure Mother    • Atrial fibrillation Mother    • Heart attack Maternal Grandfather    • Heart attack Other    • Heart attack  "Brother 68       Review of Systems   Constitution: Positive for weight gain (up 2 lb). Negative for decreased appetite and malaise/fatigue.   HENT: Negative.    Cardiovascular: Negative for chest pain, dyspnea on exertion, leg swelling, orthopnea, palpitations and syncope.   Respiratory: Negative for cough (resolved now ) and shortness of breath.    Endocrine: Negative.    Hematologic/Lymphatic: Negative.    Skin: Negative.    Musculoskeletal: Negative for falls and myalgias.   Gastrointestinal: Negative for abdominal pain and melena.   Genitourinary: Negative for dysuria and hematuria.   Neurological: Negative for dizziness.   Psychiatric/Behavioral: Negative for altered mental status and depression.   Allergic/Immunologic: Negative.       Diabetes- No  Thyroid-normal    Objective     /78 (BP Location: Left arm)   Pulse 72   Ht 162.6 cm (64.02\")   Wt 66.2 kg (146 lb)   BMI 25.05 kg/m²     Physical Exam   Constitutional: She is oriented to person, place, and time. She appears well-developed and well-nourished. No distress.   HENT:   Head: Normocephalic.   Eyes: Pupils are equal, round, and reactive to light.   Neck: Normal range of motion.   Cardiovascular: Normal rate, regular rhythm and intact distal pulses.   Pulmonary/Chest: Effort normal and breath sounds normal.   Abdominal: Soft. Bowel sounds are normal.   Musculoskeletal: Normal range of motion. She exhibits no edema.   Neurological: She is alert and oriented to person, place, and time.   Skin: Skin is warm and dry. She is not diaphoretic.   Psychiatric: She has a normal mood and affect.   Nursing note and vitals reviewed.     Procedures          Assessment/Plan      Fidelina was seen today for follow-up, lab and chest pain.    Diagnoses and all orders for this visit:    Essential hypertension    Mixed hyperlipidemia    IHD (ischemic heart disease)    Abnormal stress test    1. HTN- well controlled. Continue Coreg, Imdur. Limit sodium 1500 mg daily. " Continue regular exercise. BMI normal at 25.     2. Hyperlipidemia- stable, LDL slightly above goal. Increased from 70 to 95. Continue atorvastatin 20 mg along with heart healthy, Mediterranean style diet. Limit sugars and carbohydrates. Thank you for sending labs.     3. IHD- s/p stenting of diagonal-1, 2016; nuclear stress- small anterior ischemia, 8/2018, added Imdur. She has no recurrent anginal type symptoms. Reviewed her cardiac work up with her. Will continue to monitor only. She is encouraged to report any new or worsening symptoms. Continue aspirin, BB, statin.     Follow up in six months or sooner if needed.     Patient's Body mass index is 25.05 kg/m². BMI is within normal parameters. No follow-up required..               Electronically signed by IZZY Villasenor,  February 24, 2020 11:06 AM

## 2020-06-22 ENCOUNTER — TELEPHONE (OUTPATIENT)
Dept: CARDIOLOGY | Facility: CLINIC | Age: 75
End: 2020-06-22

## 2020-06-22 DIAGNOSIS — R55 SYNCOPE AND COLLAPSE: ICD-10-CM

## 2020-06-22 DIAGNOSIS — I25.9 IHD (ISCHEMIC HEART DISEASE): Primary | ICD-10-CM

## 2020-06-22 DIAGNOSIS — R94.39 ABNORMAL STRESS TEST: ICD-10-CM

## 2020-06-22 DIAGNOSIS — R06.02 SHORTNESS OF BREATH: ICD-10-CM

## 2020-06-22 DIAGNOSIS — E78.2 MIXED HYPERLIPIDEMIA: ICD-10-CM

## 2020-06-22 DIAGNOSIS — I20.8 STABLE ANGINA PECTORIS (HCC): ICD-10-CM

## 2020-06-22 DIAGNOSIS — I34.0 NONRHEUMATIC MITRAL VALVE REGURGITATION: ICD-10-CM

## 2020-06-22 DIAGNOSIS — I10 ESSENTIAL HYPERTENSION: ICD-10-CM

## 2020-06-22 NOTE — TELEPHONE ENCOUNTER
Recommend repeat cardiac work up.     Stress test, echo, carotid US. May need holter if work up is normal.     I will place orders. Does she think she can walk on treadmill? I ordered walking.

## 2020-06-22 NOTE — TELEPHONE ENCOUNTER
Patient was made aware of orders for stress, echo, and carotid US. She states that she feels like she can walk on treadmill.

## 2020-06-22 NOTE — TELEPHONE ENCOUNTER
Patient called reporting that yesterday she was at [employer] and started sweating, got dizzy, blurry eyed, and then passed out. She states that the [employer] was cool. She states that she did not go to ER. When she got home from [employer], she took BP and it was 119/76 and HR was 77.    She states that she has felt fine today. She states that she is still taking coreg 6.25 mg BID, aspirin 81 mg daily, and imdur 30 mg daily.     She states that PCP did do lab work a few weeks ago, I can call to obtain.

## 2020-06-25 ENCOUNTER — HOSPITAL ENCOUNTER (OUTPATIENT)
Dept: CARDIOLOGY | Facility: HOSPITAL | Age: 75
Discharge: HOME | End: 2020-06-25

## 2020-06-25 VITALS — BODY MASS INDEX: 24.92 KG/M2 | HEIGHT: 64 IN | WEIGHT: 145.94 LBS

## 2020-06-25 DIAGNOSIS — R06.02 SHORTNESS OF BREATH: ICD-10-CM

## 2020-06-25 DIAGNOSIS — R94.39 ABNORMAL STRESS TEST: ICD-10-CM

## 2020-06-25 DIAGNOSIS — I25.9 IHD (ISCHEMIC HEART DISEASE): ICD-10-CM

## 2020-06-25 DIAGNOSIS — I20.8 STABLE ANGINA PECTORIS (HCC): ICD-10-CM

## 2020-06-25 DIAGNOSIS — R55 SYNCOPE AND COLLAPSE: ICD-10-CM

## 2020-06-25 DIAGNOSIS — I34.0 NONRHEUMATIC MITRAL VALVE REGURGITATION: ICD-10-CM

## 2020-06-25 DIAGNOSIS — E78.2 MIXED HYPERLIPIDEMIA: ICD-10-CM

## 2020-06-25 DIAGNOSIS — I10 ESSENTIAL HYPERTENSION: ICD-10-CM

## 2020-06-25 LAB
AORTIC DIMENSIONLESS INDEX: 0.7 (DI)
BH CV ECHO MEAS - ACS: 1.7 CM
BH CV ECHO MEAS - AI DEC SLOPE: 195 CM/SEC^2
BH CV ECHO MEAS - AI MAX PG: 53.9 MMHG
BH CV ECHO MEAS - AI MAX VEL: 367 CM/SEC
BH CV ECHO MEAS - AI P1/2T: 551.2 MSEC
BH CV ECHO MEAS - AO MAX PG (FULL): 3.5 MMHG
BH CV ECHO MEAS - AO MAX PG: 6.4 MMHG
BH CV ECHO MEAS - AO MEAN PG (FULL): 2 MMHG
BH CV ECHO MEAS - AO MEAN PG: 3 MMHG
BH CV ECHO MEAS - AO ROOT AREA (BSA CORRECTED): 1.8
BH CV ECHO MEAS - AO ROOT AREA: 7.1 CM^2
BH CV ECHO MEAS - AO ROOT DIAM: 3 CM
BH CV ECHO MEAS - AO V2 MAX: 126 CM/SEC
BH CV ECHO MEAS - AO V2 MEAN: 81.5 CM/SEC
BH CV ECHO MEAS - AO V2 VTI: 31.4 CM
BH CV ECHO MEAS - BSA(HAYCOCK): 1.7 M^2
BH CV ECHO MEAS - BSA: 1.7 M^2
BH CV ECHO MEAS - BZI_BMI: 25.1 KILOGRAMS/M^2
BH CV ECHO MEAS - BZI_METRIC_HEIGHT: 162.6 CM
BH CV ECHO MEAS - BZI_METRIC_WEIGHT: 66.2 KG
BH CV ECHO MEAS - EDV(CUBED): 74.6 ML
BH CV ECHO MEAS - EDV(TEICH): 79 ML
BH CV ECHO MEAS - EF(CUBED): 74.2 %
BH CV ECHO MEAS - EF(TEICH): 66.4 %
BH CV ECHO MEAS - ESV(CUBED): 19.2 ML
BH CV ECHO MEAS - ESV(TEICH): 26.5 ML
BH CV ECHO MEAS - FS: 36.3 %
BH CV ECHO MEAS - IVS/LVPW: 0.9
BH CV ECHO MEAS - IVSD: 0.97 CM
BH CV ECHO MEAS - LA DIMENSION(2D): 4.3 CM
BH CV ECHO MEAS - LA DIMENSION: 4.2 CM
BH CV ECHO MEAS - LA/AO: 1.4
BH CV ECHO MEAS - LAT PEAK E' VEL: 8.8 CM/SEC
BH CV ECHO MEAS - LV IVRT: 0.09 SEC
BH CV ECHO MEAS - LV MASS(C)D: 143 GRAMS
BH CV ECHO MEAS - LV MASS(C)DI: 83.6 GRAMS/M^2
BH CV ECHO MEAS - LV MAX PG: 2.9 MMHG
BH CV ECHO MEAS - LV MEAN PG: 1 MMHG
BH CV ECHO MEAS - LV V1 MAX: 84.9 CM/SEC
BH CV ECHO MEAS - LV V1 MEAN: 53.3 CM/SEC
BH CV ECHO MEAS - LV V1 VTI: 20.9 CM
BH CV ECHO MEAS - LVIDD: 4.2 CM
BH CV ECHO MEAS - LVIDS: 2.7 CM
BH CV ECHO MEAS - LVPWD: 1.1 CM
BH CV ECHO MEAS - MED PEAK E' VEL: 8.5 CM/SEC
BH CV ECHO MEAS - MV A MAX VEL: 69.8 CM/SEC
BH CV ECHO MEAS - MV DEC SLOPE: 491 CM/SEC^2
BH CV ECHO MEAS - MV DEC TIME: 0.22 SEC
BH CV ECHO MEAS - MV E MAX VEL: 91.2 CM/SEC
BH CV ECHO MEAS - MV E/A: 1.3
BH CV ECHO MEAS - MV MAX PG: 3.7 MMHG
BH CV ECHO MEAS - MV MEAN PG: 1 MMHG
BH CV ECHO MEAS - MV P1/2T MAX VEL: 112 CM/SEC
BH CV ECHO MEAS - MV P1/2T: 66.8 MSEC
BH CV ECHO MEAS - MV V2 MAX: 96.1 CM/SEC
BH CV ECHO MEAS - MV V2 MEAN: 48.5 CM/SEC
BH CV ECHO MEAS - MV V2 VTI: 28.3 CM
BH CV ECHO MEAS - MVA P1/2T LCG: 2 CM^2
BH CV ECHO MEAS - MVA(P1/2T): 3.3 CM^2
BH CV ECHO MEAS - PA MAX PG (FULL): 0.31 MMHG
BH CV ECHO MEAS - PA MAX PG: 3.1 MMHG
BH CV ECHO MEAS - PA MEAN PG (FULL): 0 MMHG
BH CV ECHO MEAS - PA MEAN PG: 2 MMHG
BH CV ECHO MEAS - PA V2 MAX: 87.5 CM/SEC
BH CV ECHO MEAS - PA V2 MEAN: 64.1 CM/SEC
BH CV ECHO MEAS - PA V2 VTI: 24 CM
BH CV ECHO MEAS - PI END-D VEL: 113 CM/SEC
BH CV ECHO MEAS - RV MAX PG: 2.7 MMHG
BH CV ECHO MEAS - RV MEAN PG: 2 MMHG
BH CV ECHO MEAS - RV V1 MAX: 82.9 CM/SEC
BH CV ECHO MEAS - RV V1 MEAN: 60.5 CM/SEC
BH CV ECHO MEAS - RV V1 VTI: 26.1 CM
BH CV ECHO MEAS - RVDD: 2.7 CM
BH CV ECHO MEAS - SI(AO): 129.7 ML/M^2
BH CV ECHO MEAS - SI(CUBED): 32.4 ML/M^2
BH CV ECHO MEAS - SI(TEICH): 30.7 ML/M^2
BH CV ECHO MEAS - SV(AO): 222 ML
BH CV ECHO MEAS - SV(CUBED): 55.4 ML
BH CV ECHO MEAS - SV(TEICH): 52.5 ML
BH CV ECHO MEASUREMENTS AVERAGE E/E' RATIO: 10.54
BH CV NUCLEAR PRIOR STUDY: 3
BH CV STRESS DURATION MIN STAGE 1: 3
BH CV STRESS DURATION SEC STAGE 1: 0
BH CV STRESS GRADE STAGE 1: 10
BH CV STRESS METS STAGE 1: 5
BH CV STRESS PROTOCOL 1: NORMAL
BH CV STRESS RECOVERY BP: NORMAL MMHG
BH CV STRESS RECOVERY HR: 71 BPM
BH CV STRESS SPEED STAGE 1: 1.7
BH CV STRESS STAGE 1: 1
LV EF NUC BP: 72 %
MAXIMAL PREDICTED HEART RATE: 148 BPM
MAXIMAL PREDICTED HEART RATE: 148 BPM
PERCENT MAX PREDICTED HR: 87.84 %
STRESS BASELINE BP: NORMAL MMHG
STRESS BASELINE HR: 56 BPM
STRESS PERCENT HR: 103 %
STRESS POST ESTIMATED WORKLOAD: 7 METS
STRESS POST EXERCISE DUR MIN: 6 MIN
STRESS POST PEAK BP: NORMAL MMHG
STRESS POST PEAK HR: 130 BPM
STRESS TARGET HR: 126 BPM
STRESS TARGET HR: 126 BPM

## 2020-06-25 PROCEDURE — 93306 TTE W/DOPPLER COMPLETE: CPT | Performed by: INTERNAL MEDICINE

## 2020-06-25 PROCEDURE — 93356 MYOCRD STRAIN IMG SPCKL TRCK: CPT | Performed by: INTERNAL MEDICINE

## 2020-06-25 PROCEDURE — 0 TECHNETIUM SESTAMIBI: Performed by: INTERNAL MEDICINE

## 2020-06-25 PROCEDURE — 93016 CV STRESS TEST SUPVJ ONLY: CPT | Performed by: NURSE PRACTITIONER

## 2020-06-25 PROCEDURE — A9500 TC99M SESTAMIBI: HCPCS | Performed by: INTERNAL MEDICINE

## 2020-06-25 PROCEDURE — 93880 EXTRACRANIAL BILAT STUDY: CPT | Performed by: RADIOLOGY

## 2020-06-25 PROCEDURE — 93306 TTE W/DOPPLER COMPLETE: CPT

## 2020-06-25 PROCEDURE — 93018 CV STRESS TEST I&R ONLY: CPT | Performed by: INTERNAL MEDICINE

## 2020-06-25 PROCEDURE — 93880 EXTRACRANIAL BILAT STUDY: CPT

## 2020-06-25 PROCEDURE — 78452 HT MUSCLE IMAGE SPECT MULT: CPT | Performed by: INTERNAL MEDICINE

## 2020-06-25 PROCEDURE — 93017 CV STRESS TEST TRACING ONLY: CPT

## 2020-06-25 PROCEDURE — 78452 HT MUSCLE IMAGE SPECT MULT: CPT

## 2020-06-25 RX ADMIN — TECHNETIUM TC 99M SESTAMIBI 1 DOSE: 1 INJECTION INTRAVENOUS at 08:21

## 2020-06-25 RX ADMIN — TECHNETIUM TC 99M SESTAMIBI 1 DOSE: 1 INJECTION INTRAVENOUS at 08:20

## 2020-06-26 ENCOUNTER — TELEPHONE (OUTPATIENT)
Dept: CARDIOLOGY | Facility: CLINIC | Age: 75
End: 2020-06-26

## 2020-06-26 RX ORDER — CARVEDILOL 12.5 MG/1
12.5 TABLET ORAL 2 TIMES DAILY
Qty: 180 TABLET | Refills: 1 | Status: SHIPPED | OUTPATIENT
Start: 2020-06-26 | End: 2020-11-02

## 2020-06-26 NOTE — TELEPHONE ENCOUNTER
----- Message from IZZY Villasenor sent at 6/26/2020  8:18 AM EDT -----  See notes on stress- normal pictures, good blood flow.     Mild hypertensive and heart rate response. Max bp 180/84. Recommended increasing Coreg to 12.5 mg BID

## 2020-06-26 NOTE — TELEPHONE ENCOUNTER
Patient aware, see stress test results. Script for Coreg 12.5mg bid sent to [facility] mail order pharmacy per patient request.

## 2020-09-28 ENCOUNTER — APPOINTMENT (OUTPATIENT)
Dept: WOMENS IMAGING | Facility: HOSPITAL | Age: 75
End: 2020-09-28

## 2020-09-28 PROCEDURE — 77063 BREAST TOMOSYNTHESIS BI: CPT | Performed by: RADIOLOGY

## 2020-09-28 PROCEDURE — 77067 SCR MAMMO BI INCL CAD: CPT | Performed by: RADIOLOGY

## 2020-11-02 RX ORDER — CARVEDILOL 12.5 MG/1
TABLET ORAL
Qty: 180 TABLET | Refills: 1 | Status: SHIPPED | OUTPATIENT
Start: 2020-11-02 | End: 2021-03-22

## 2020-11-17 ENCOUNTER — OFFICE VISIT (OUTPATIENT)
Dept: CARDIOLOGY | Facility: CLINIC | Age: 75
End: 2020-11-17

## 2020-11-17 VITALS
DIASTOLIC BLOOD PRESSURE: 80 MMHG | SYSTOLIC BLOOD PRESSURE: 120 MMHG | HEIGHT: 64 IN | WEIGHT: 142 LBS | BODY MASS INDEX: 24.24 KG/M2 | HEART RATE: 64 BPM | TEMPERATURE: 98 F

## 2020-11-17 DIAGNOSIS — E78.2 MIXED HYPERLIPIDEMIA: ICD-10-CM

## 2020-11-17 DIAGNOSIS — R42 LIGHT HEADEDNESS: ICD-10-CM

## 2020-11-17 DIAGNOSIS — I25.9 IHD (ISCHEMIC HEART DISEASE): Primary | ICD-10-CM

## 2020-11-17 DIAGNOSIS — I20.9 ANGINAL SYNDROME (HCC): ICD-10-CM

## 2020-11-17 DIAGNOSIS — I10 ESSENTIAL HYPERTENSION: ICD-10-CM

## 2020-11-17 PROCEDURE — 99214 OFFICE O/P EST MOD 30 MIN: CPT | Performed by: NURSE PRACTITIONER

## 2020-11-17 RX ORDER — OMEPRAZOLE 20 MG/1
20 CAPSULE, DELAYED RELEASE ORAL DAILY
COMMUNITY

## 2020-11-17 NOTE — PROGRESS NOTES
Chief Complaint   Patient presents with   • Follow-up     For cardiac management. Patient is on aspirin. Last lab work was done on 06/10/20 per PCP, in chart under labs. States that eating a lot of sugar causes her to pass out or feel like she is going to pass out, which is why she had stress test.    • Med Refill     Needs refills on cardiac medications. [facility]. Brought medication list with visit.        Cardiac Complaints  none      Subjective   Fidelina Bravo is a 75 y.o. female with hypertension, hypercholesterolemia, and IHD diagnosed in January 2016 when she presented with chest pain and elevated cardiac enzymes. She was noted to have a significant disease of the diagonal which was stented. Stress test was repeated August 2018. Small area of anterior wall ischemia noted. Long-acting nitrates added with plan for cath if she developed angina. She then called in June 2020 with dizziness, light headedness, and diaphoresis. She was advised to repeat cardiac workup.  Stress showed no ischemia, no arrhythmia, and good LV function. No valve concerns on echo. Carotid US also done at that time showed no significant stenosis bilaterally. Labs from June 2020 showed HH 13.3/39.3, TRIG 83, LDL 83, HDL 43, GLU 88, BUN 16, Creatinine 0.87, GFR 77, Na 142, K 4.2.    She returns today for follow up and denies any new cardiac concerns. Chest pain, SOA, palpitations, and dizziness denied. She does report light headedness and blurry vision if she eats too much sugar. She states it makes her feel like she may pass out and will become instantly diaphoretic. Labs remain followed by PCP but she has not had a glucose tolerance test. Refills are not needed as they remain followed by PCP. No bleeding or bruising reported.            Cardiac History  Past Surgical History:   Procedure Laterality Date   • CARDIAC CATHETERIZATION  01/02/2016    Dr. Marks)- 99% D1- 2.25x8 JONI   • CARDIOVASCULAR STRESS TEST   02/15/2012    4 min, 86% THR. BP- 186/82. Septal Ischemia   • CARDIOVASCULAR STRESS TEST  12/17/2015    8 min 31 sec. 85% THR. Anterior Ischemia   • CARDIOVASCULAR STRESS TEST  08/15/2018    7 Min. 20 Secs.9.0 METS. 88% THR. BP- 180/83. Small anterior Ischemia.   • CARDIOVASCULAR STRESS TEST  06/25/2020    6 Min. 7.0 METS. 88% THR. BP- 180/84. EF 72% Negative.   • ECHO - CONVERTED  02/24/2012    Echo- EF 65%   • ECHO - CONVERTED  12/17/2015    Echo- EF 65%. RVSP- 45 mmHg   • ECHO - CONVERTED  01/01/2016    Echo- ([facility]. Dr. Marks) EF 65%. RVSP- 36 mmHg   • ECHO - CONVERTED  08/15/2018    EF 65%. Mild MR & AI. RVSP- 37 mmHg.   • ECHO - CONVERTED  06/25/2020    EF 65%. LA- 4.2 Cm. Mild MR & AI   • US CAROTID UNILATERAL  06/25/2020    Neg for flow limiting stenosis bilaterally       Current Outpatient Medications   Medication Sig Dispense Refill   • acetaminophen (TYLENOL) 325 MG tablet Take 650 mg by mouth every 4 (four) hours as needed for mild pain (1-3).     • aspirin 81 MG EC tablet Take 81 mg by mouth daily.     • atorvastatin (LIPITOR) 20 MG tablet Take 20 mg by mouth every night.     • carvedilol (COREG) 12.5 MG tablet TAKE 1 TABLET TWICE DAILY 180 tablet 1   • cetirizine (ZyrTEC) 10 MG tablet Take 10 mg by mouth daily.     • cholecalciferol (VITAMIN D3) 1000 UNITS tablet Take 1,000 Units by mouth daily.     • isosorbide mononitrate (IMDUR) 30 MG 24 hr tablet Take 1 tablet by mouth Daily. 30 tablet 11   • meloxicam (MOBIC) 15 MG tablet Take 15 mg by mouth As Needed.     • Multiple Vitamin (MULTI VITAMIN DAILY PO) Take 1 tablet by mouth daily.     • nitroglycerin (NITROSTAT) 0.4 MG SL tablet Place 1 tablet under the tongue Every 5 (Five) Minutes As Needed for Chest Pain. Take no more than 3 doses in 15 minutes. 25 tablet 1   • omeprazole (priLOSEC) 20 MG capsule Take 20 mg by mouth Daily.       No current facility-administered medications for this visit.        Compazine [prochlorperazine edisylate], Codeine,  "Penicillins, and Sulfa antibiotics    Past Medical History:   Diagnosis Date   • GERD (gastroesophageal reflux disease)    • H/O colonoscopy with polypectomy    • History of back surgery    • History of breast biopsy     benign   • Hypercholesteremia    • Osteoporosis        Social History     Socioeconomic History   • Marital status:      Spouse name: Not on file   • Number of children: Not on file   • Years of education: Not on file   • Highest education level: Not on file   Tobacco Use   • Smoking status: Never Smoker   • Smokeless tobacco: Never Used   Substance and Sexual Activity   • Alcohol use: No   • Drug use: No       Family History   Problem Relation Age of Onset   • Heart failure Mother    • Atrial fibrillation Mother    • Heart attack Maternal Grandfather    • Heart attack Other    • Heart attack Brother 68       Review of Systems   Constitution: Negative for malaise/fatigue and night sweats.   Cardiovascular: Negative for chest pain, claudication, dyspnea on exertion, irregular heartbeat, leg swelling, near-syncope, orthopnea and palpitations.   Respiratory: Negative for cough, shortness of breath and wheezing.    Musculoskeletal: Positive for stiffness. Negative for back pain and joint pain.   Gastrointestinal: Negative for anorexia, heartburn, melena, nausea and vomiting.   Genitourinary: Negative for dysuria, hematuria, hesitancy and nocturia.   Neurological: Negative for dizziness, light-headedness and loss of balance.   Psychiatric/Behavioral: Negative for depression and memory loss. The patient is not nervous/anxious.            Objective     /80 (BP Location: Left arm)   Pulse 64   Temp 98 °F (36.7 °C)   Ht 162 cm (63.78\")   Wt 64.4 kg (142 lb)   BMI 24.54 kg/m²     Constitutional:       Appearance: Healthy appearance. Not in distress.   Eyes:      Pupils: Pupils are equal, round, and reactive to light.   HENT:      Nose: Nose normal.   Neck:      Musculoskeletal: Normal range " of motion and neck supple.   Pulmonary:      Effort: Pulmonary effort is normal.   Cardiovascular:      PMI at left midclavicular line. Normal rate. Regular rhythm.      Murmurs: There is a systolic murmur.   Abdominal:      Palpations: Abdomen is soft.   Musculoskeletal: Normal range of motion.   Skin:     General: Skin is warm and dry.   Neurological:      Mental Status: Oriented to person, place and time.         Procedures    Assessment/Plan     IHD:  No new cardiac concerns are voiced. Most recent stress June 2020 showed no ischemia and good LV function. ASA therapy continued. Chest pain denied on current imdur therapy.  No repeat workup advised.    HTN:  On coreg therapy without concerns. Blood pressure well managed. Current advised.    Hyperlipidemia:  On statin therapy with lipitor and tolerates well. FLP showed lipids at goal. Current to be continued.    Light headedness/pre-syncope:  Patient encouraged on adequate protein intake and to discuss 3 hour glucose tolerance with your office.  Recent carotid US negative for flow limiting stenosis.    BMI noted at 24.54.  Good cardiac diet with limited carbs, calories, and activity as tolerated advised.    6 month follow up recommended or sooner if needed.    Refills with your office.        Problems Addressed this Visit        Cardiovascular and Mediastinum    Hyperlipemia    Anginal syndrome (CMS/HCC) - Primary    Essential hypertension      Other Visit Diagnoses     Light headedness          Diagnoses       Codes Comments    IHD (ischemic heart disease)    -  Primary ICD-10-CM: I25.9  ICD-9-CM: 414.9     Essential hypertension     ICD-10-CM: I10  ICD-9-CM: 401.9     Mixed hyperlipidemia     ICD-10-CM: E78.2  ICD-9-CM: 272.2     Light headedness     ICD-10-CM: R42  ICD-9-CM: 780.4     Anginal syndrome (CMS/HCC)     ICD-10-CM: I20.9  ICD-9-CM: 413.9           Patient's Body mass index is 24.54 kg/m². BMI is within normal parameters. No follow-up  required..                Electronically signed by Joyce Rodarte, APRN November 17, 2020 12:38 EST

## 2021-03-22 RX ORDER — CARVEDILOL 12.5 MG/1
TABLET ORAL
Qty: 180 TABLET | Refills: 1 | Status: SHIPPED | OUTPATIENT
Start: 2021-03-22 | End: 2021-05-18 | Stop reason: SDUPTHER

## 2021-05-18 ENCOUNTER — OFFICE VISIT (OUTPATIENT)
Dept: CARDIOLOGY | Facility: CLINIC | Age: 76
End: 2021-05-18

## 2021-05-18 VITALS
DIASTOLIC BLOOD PRESSURE: 76 MMHG | TEMPERATURE: 97.7 F | BODY MASS INDEX: 24.75 KG/M2 | SYSTOLIC BLOOD PRESSURE: 122 MMHG | HEIGHT: 64 IN | HEART RATE: 68 BPM | WEIGHT: 145 LBS

## 2021-05-18 DIAGNOSIS — I10 ESSENTIAL HYPERTENSION: ICD-10-CM

## 2021-05-18 DIAGNOSIS — E78.2 MIXED HYPERLIPIDEMIA: ICD-10-CM

## 2021-05-18 DIAGNOSIS — I25.9 IHD (ISCHEMIC HEART DISEASE): Primary | ICD-10-CM

## 2021-05-18 DIAGNOSIS — R42 EPISODIC LIGHTHEADEDNESS: ICD-10-CM

## 2021-05-18 PROCEDURE — 99213 OFFICE O/P EST LOW 20 MIN: CPT | Performed by: NURSE PRACTITIONER

## 2021-05-18 RX ORDER — CARVEDILOL 12.5 MG/1
12.5 TABLET ORAL 2 TIMES DAILY
Qty: 180 TABLET | Refills: 2 | Status: SHIPPED | OUTPATIENT
Start: 2021-05-18 | End: 2022-03-15

## 2021-05-18 NOTE — PROGRESS NOTES
Chief Complaint   Patient presents with   • Follow-up     Cardiac management . Has no cardiac complaints today.   • LABS     Will have labs next month per PCP   • Med Refill     Needs refills on Coreg 90 day supply to  [facility]       Iker Bravo is a 75 y.o. female  with hypertension, hypercholesterolemia, and IHD diagnosed in January 2016 when she presented with chest pain and elevated cardiac enzymes. She was noted to have a significant disease of the diagonal which was stented. Stress test was repeated August 2018. Small area of anterior wall ischemia noted. Long-acting nitrates added with plan for cath if she developed angina. She then called in June 2020 with dizziness, light headedness, and diaphoresis. She was advised to repeat cardiac workup.  Stress showed no ischemia, no arrhythmia, and good LV function. No valve concerns on echo. Carotid US also done at that time showed no significant stenosis bilaterally.     Today she comes to the office for a follow-up visit.  No cardiac complaints or concerns voiced.  She does have issues with low blood sugar if she does not monitor her diet closely.  No recent change in cardiac medications noted.  She walks about 2 miles most days for exercise without issue.    Cardiac History:    Past Surgical History:   Procedure Laterality Date   • CARDIAC CATHETERIZATION  01/02/2016    Dr. Marks)- 99% D1- 2.25x8 JONI   • CARDIOVASCULAR STRESS TEST  02/15/2012    4 min, 86% THR. BP- 186/82. Septal Ischemia   • CARDIOVASCULAR STRESS TEST  12/17/2015    8 min 31 sec. 85% THR. Anterior Ischemia   • CARDIOVASCULAR STRESS TEST  08/15/2018    7 Min. 20 Secs.9.0 METS. 88% THR. BP- 180/83. Small anterior Ischemia.   • CARDIOVASCULAR STRESS TEST  06/25/2020    6 Min. 7.0 METS. 88% THR. BP- 180/84. EF 72% Negative.   • ECHO - CONVERTED  02/24/2012    Echo- EF 65%   • ECHO - CONVERTED  12/17/2015    Echo- EF 65%. RVSP- 45 mmHg   • ECHO - CONVERTED   01/01/2016    Echo- ([facility]. Dr. Marks) EF 65%. RVSP- 36 mmHg   • ECHO - CONVERTED  08/15/2018    EF 65%. Mild MR & AI. RVSP- 37 mmHg.   • ECHO - CONVERTED  06/25/2020    EF 65%. LA- 4.2 Cm. Mild MR & AI   • US CAROTID UNILATERAL  06/25/2020    Neg for flow limiting stenosis bilaterally       Current Outpatient Medications   Medication Sig Dispense Refill   • acetaminophen (TYLENOL) 325 MG tablet Take 650 mg by mouth every 4 (four) hours as needed for mild pain (1-3).     • aspirin 81 MG EC tablet Take 81 mg by mouth daily.     • atorvastatin (LIPITOR) 20 MG tablet Take 20 mg by mouth every night.     • carvedilol (COREG) 12.5 MG tablet Take 1 tablet by mouth 2 (Two) Times a Day. 180 tablet 2   • cetirizine (ZyrTEC) 10 MG tablet Take 10 mg by mouth daily.     • cholecalciferol (VITAMIN D3) 1000 UNITS tablet Take 1,000 Units by mouth daily.     • isosorbide mononitrate (IMDUR) 30 MG 24 hr tablet Take 1 tablet by mouth Daily. 30 tablet 11   • meloxicam (MOBIC) 15 MG tablet Take 15 mg by mouth As Needed.     • Multiple Vitamin (MULTI VITAMIN DAILY PO) Take 1 tablet by mouth daily.     • nitroglycerin (NITROSTAT) 0.4 MG SL tablet Place 1 tablet under the tongue Every 5 (Five) Minutes As Needed for Chest Pain. Take no more than 3 doses in 15 minutes. 25 tablet 1   • omeprazole (priLOSEC) 20 MG capsule Take 20 mg by mouth Daily.       No current facility-administered medications for this visit.       Compazine [prochlorperazine edisylate], Codeine, Penicillins, and Sulfa antibiotics    Past Medical History:   Diagnosis Date   • GERD (gastroesophageal reflux disease)    • H/O colonoscopy with polypectomy    • History of back surgery    • History of breast biopsy     benign   • Hypercholesteremia    • Osteoporosis        Social History     Socioeconomic History   • Marital status:      Spouse name: Not on file   • Number of children: Not on file   • Years of education: Not on file   • Highest education level: Not on  "file   Tobacco Use   • Smoking status: Never Smoker   • Smokeless tobacco: Never Used   Vaping Use   • Vaping Use: Never used   Substance and Sexual Activity   • Alcohol use: No   • Drug use: No       Family History   Problem Relation Age of Onset   • Heart failure Mother    • Atrial fibrillation Mother    • Heart attack Maternal Grandfather    • Heart attack Other    • Heart attack Brother 68       Review of Systems   Constitutional: Positive for diaphoresis (r/t low glocose). Negative for decreased appetite and malaise/fatigue.   HENT: Negative for nosebleeds.    Eyes: Negative for blurred vision.   Cardiovascular: Negative for chest pain, claudication, cyanosis, dyspnea on exertion, irregular heartbeat, leg swelling, near-syncope, orthopnea, palpitations, paroxysmal nocturnal dyspnea and syncope.   Respiratory: Negative for shortness of breath.    Endocrine: Negative for cold intolerance and heat intolerance.   Hematologic/Lymphatic: Does not bruise/bleed easily.   Skin: Negative for rash.   Musculoskeletal: Negative for myalgias.   Gastrointestinal: Negative for heartburn, melena and nausea.   Genitourinary: Negative for dysuria and hematuria.   Neurological: Positive for light-headedness (r/t low glucose). Negative for dizziness.   Psychiatric/Behavioral: The patient does not have insomnia and is not nervous/anxious.         BP Readings from Last 5 Encounters:   05/18/21 122/76   11/17/20 120/80   02/24/20 110/78   08/13/19 122/80   02/07/19 106/70       Wt Readings from Last 5 Encounters:   05/18/21 65.8 kg (145 lb)   11/17/20 64.4 kg (142 lb)   06/25/20 66.2 kg (145 lb 15.1 oz)   02/24/20 66.2 kg (146 lb)   08/13/19 65.3 kg (144 lb)       Objective      June 2020: HH 13.3/39.3, TRIG 83, LDL 83, HDL 43, GLU 88, BUN 16, Creatinine 0.87, GFR 77, Na 142, K 4.2.    /76 (BP Location: Left arm)   Pulse 68   Temp 97.7 °F (36.5 °C)   Ht 162 cm (63.78\")   Wt 65.8 kg (145 lb)   BMI 25.06 kg/m²     Vitals and " nursing note reviewed.   Eyes:      Pupils: Pupils are equal, round, and reactive to light.   HENT:      Head: Normocephalic.   Neck:      Vascular: No carotid bruit.   Pulmonary:      Breath sounds: Normal breath sounds.   Cardiovascular:      Normal rate. Regular rhythm.   Pulses:     Intact distal pulses.   Edema:     Peripheral edema absent.   Abdominal:      General: Bowel sounds are normal.      Palpations: Abdomen is soft.   Musculoskeletal: Normal range of motion.      Cervical back: Normal range of motion. Skin:     General: Skin is warm.   Neurological:      Mental Status: Alert and oriented to person, place, and time.          Procedures : none today          Assessment/Plan   Diagnoses and all orders for this visit:    1. IHD (ischemic heart disease) (Primary)    2. Essential hypertension  -     carvedilol (COREG) 12.5 MG tablet; Take 1 tablet by mouth 2 (Two) Times a Day.  Dispense: 180 tablet; Refill: 2    3. Mixed hyperlipidemia    4. Episodic lightheadedness    IHD-cardiac work-up in June of last year was negative for ischemia and showed normal heart function.  She presents today without cardiac complaints or concerns.  No repeat testing warranted at this time.  Continue statin, aspirin, Imdur, and Coreg.  She has Nitrostat if needed.    Hypertension-blood pressure normal today.  Continue carvedilol 12.5 twice a day.  DASH diet.    Mixed hyperlipidemia-currently on Lipitor without side effects noted.  Continue same.  She will follow with you for lab orders/management.  Please forward copy next lab report.    Patient's Body mass index is 25.06 kg/m². indicating that she is slightly overweight (BMI 25-29.9). Obesity-related health conditions include the following: coronary heart disease. Obesity is improving with treatment. BMI is is above average; BMI management plan is completed. We discussed portion control and increasing exercise.    Patient does admit to some symptoms related to decreased blood  sugar.  Handout on hypoglycemia and dietary management provided.  If symptoms persist or worsen she understands to follow-up with you and 4-hour glucose tolerance test can be considered.    Patient appears stable from a cardiac standpoint.  A 6-month follow-up visit scheduled.  Please call sooner if cardiac concerns.            Electronically signed by IZZY Powell,  May 18, 2021 09:38 EDT

## 2021-05-18 NOTE — PATIENT INSTRUCTIONS
Preventing Hypoglycemia  Hypoglycemia occurs when the level of sugar (glucose) in the blood is too low. Hypoglycemia can happen in people who do or do not have diabetes (diabetes mellitus). It can develop quickly, and it can be a medical emergency. For most people with diabetes, a blood glucose level below 70 mg/dL (3.9 mmol/L) is considered hypoglycemia.  Glucose is a type of sugar that provides the body's main source of energy. Certain hormones (insulin and glucagon) control the level of glucose in the blood. Insulin lowers blood glucose, and glucagon increases blood glucose. Hypoglycemia can result from having too much insulin in the bloodstream, or from not eating enough food that contains glucose.  Your risk for hypoglycemia is higher:  · If you take insulin or diabetes medicines to help lower your blood glucose or help your body make more insulin.  · If you skip or delay a meal or snack.  · If you are ill.  · During and after exercise.  You can prevent hypoglycemia by working with your health care provider to adjust your meal plan as needed and by taking other precautions.  How can hypoglycemia affect me?  Mild symptoms  Mild hypoglycemia may not cause any symptoms. If you do have symptoms, they may include:  · Hunger.  · Anxiety.  · Sweating and feeling clammy.  · Dizziness or feeling light-headed.  · Sleepiness.  · Nausea.  · Increased heart rate.  · Headache.  · Blurry vision.  · Irritability.  · Tingling or numbness around the mouth, lips, or tongue.  · A change in coordination.  · Restless sleep.  If mild hypoglycemia is not recognized and treated, it can quickly become moderate or severe hypoglycemia.  Moderate symptoms  Moderate hypoglycemia can cause:  · Mental confusion and poor judgment.  · Behavior changes.  · Weakness.  · Irregular heartbeat.  Severe symptoms  Severe hypoglycemia is a medical emergency. It can cause:  · Fainting.  · Seizures.  · Loss of consciousness (coma).  · Death.  What  nutrition changes can be made?  · Work with your health care provider or diet and nutrition specialist (dietitian) to make a healthy meal plan that is right for you. Follow your meal plan carefully.  · Eat meals at regular times.  · If recommended by your health care provider, have snacks between meals.  · Donot skip or delay meals or snacks. You can be at risk for hypoglycemia if you are not getting enough carbohydrates.  What lifestyle changes can be made?    · Work closely with your health care provider to manage your blood glucose. Make sure you know:  ? Your goal blood glucose levels.  ? How and when to check your blood glucose.  ? The symptoms of hypoglycemia. It is important to treat it right away to keep it from becoming severe.  · Do not drink alcohol on an empty stomach.  · When you are ill, check your blood glucose more often than usual. Follow your sick day plan whenever you cannot eat or drink normally. Make this plan in advance with your health care provider.  · Always check your blood glucose before, during, and after exercise.  How is this treated?  This condition can often be treated by immediately eating or drinking something that contains sugar, such as:  · Fruit juice, 4-6 oz (120-150 mL).  · Regular (not diet) soda, 4-6 oz (120-150 mL).  · Low-fat milk, 4 oz (120 mL).  · Several pieces of hard candy.  · Sugar or honey, 1 Tbsp (15 mL).  Treating hypoglycemia if you have diabetes  If you are alert and able to swallow safely, follow the 15:15 rule:  · Take 15 grams of a rapid-acting carbohydrate. Talk with your health care provider about how much you should take.  · Rapid-acting options include:  ? Glucose pills (take 15 grams).  ? 6-8 pieces of hard candy.  ? 4-6 oz (120-150 mL) of fruit juice.  ? 4-6 oz (120-150 mL) of regular (not diet) soda.  · Check your blood glucose 15 minutes after you take the carbohydrate.  · If the repeat blood glucose level is still at or below 70 mg/dL (3.9 mmol/L),  take 15 grams of a carbohydrate again.  · If your blood glucose level does not increase above 70 mg/dL (3.9 mmol/L) after 3 tries, seek emergency medical care.  · After your blood glucose level returns to normal, eat a meal or a snack within 1 hour.  Treating severe hypoglycemia  Severe hypoglycemia is when your blood glucose level is at or below 54 mg/dL (3 mmol/L). Severe hypoglycemia is a medical emergency. Get medical help right away.  If you have severe hypoglycemia and you cannot eat or drink, you may need an injection of glucagon. A family member or close friend should learn how to check your blood glucose and how to give you a glucagon injection. Ask your health care provider if you need to have an emergency glucagon injection kit available.  Severe hypoglycemia may need to be treated in a hospital. The treatment may include getting glucose through an IV. You may also need treatment for the cause of your hypoglycemia.  Where to find more information  · American Diabetes Association: www.diabetes.org  · National [place] of Diabetes and Digestive and Kidney Diseases: www.niddk.nih.gov  Contact a health care provider if:  · You have problems keeping your blood glucose in your target range.  · You have frequent episodes of hypoglycemia.  Get help right away if:  · You continue to have hypoglycemia symptoms after eating or drinking something containing glucose.  · Your blood glucose level is at or below 54 mg/dL (3 mmol/L).  · You faint.  · You have a seizure.  These symptoms may represent a serious problem that is an emergency. Do not wait to see if the symptoms will go away. Get medical help right away. Call your local emergency services (911 in the [place]  Summary  · Know the symptoms of hypoglycemia, and when you are at risk for it (such as during exercise or when you are sick). Check your blood glucose often when you are at risk for hypoglycemia.  · Hypoglycemia can develop quickly, and it can be dangerous  if it is not treated right away. If you have a history of severe hypoglycemia, make sure you know how to use your glucagon injection kit.  · Make sure you know how to treat hypoglycemia. Keep a carbohydrate snack available when you may be at risk for hypoglycemia.  This information is not intended to replace advice given to you by your health care provider. Make sure you discuss any questions you have with your health care provider.  Document Revised: 04/10/2020 Document Reviewed: 08/15/2018  [employer] Patient Education © 2021 [employer]  Hypoglycemia  Hypoglycemia occurs when the level of sugar (glucose) in the blood is too low. Hypoglycemia can happen in people who have or do not have diabetes. It can develop quickly, and it can be a medical emergency. For most people, a blood glucose level below 70 mg/dL (3.9 mmol/L) is considered hypoglycemia.  Glucose is a type of sugar that provides the body's main source of energy. Certain hormones (insulin and glucagon) control the level of glucose in the blood. Insulin lowers blood glucose, and glucagon raises blood glucose. Hypoglycemia can result from having too much insulin in the bloodstream, or from not eating enough food that contains glucose. You may also have reactive hypoglycemia, which happens within 4 hours after eating a meal.  What are the causes?  Hypoglycemia occurs most often in people who have diabetes and may be caused by:  · Diabetes medicine.  · Not eating enough, or not eating often enough.  · Increased physical activity.  · Drinking alcohol on an empty stomach.  If you do not have diabetes, hypoglycemia may be caused by:  · A tumor in the pancreas.  · Not eating enough, or not eating for long periods at a time (fasting).  · A severe infection or illness.  · Problems after having bariatric surgery.  · Organ failure, such as kidney or liver failure.  · Certain medicines.  What increases the risk?  Hypoglycemia is more likely to develop in people  who:  · Have diabetes and take medicines to lower blood glucose.  · Abuse alcohol.  · Have a severe illness.  What are the signs or symptoms?  Symptoms vary depending on whether the condition is mild, moderate, or severe.  Mild hypoglycemia  · Hunger.  · Anxiety.  · Sweating and feeling clammy.  · Dizziness or feeling light-headed.  · Sleepiness or restless sleep.  · Nausea.  · Increased heart rate.  · Headache.  · Blurry vision.  · Irritability.  · Tingling or numbness around the mouth, lips, or tongue.  · A change in coordination.  Moderate hypoglycemia  · Confusion and poor judgment.  · Behavior changes.  · Weakness.  · Irregular heartbeat.  Severe hypoglycemia  Severe hypoglycemia is a medical emergency. It can cause:  · Fainting.  · Seizures.  · Loss of consciousness (coma).  · Death.  How is this diagnosed?  Hypoglycemia is diagnosed with a blood test to measure your blood glucose level. This blood test is done while you are having symptoms. Your health care provider may also do a physical exam and review your medical history.  How is this treated?  This condition can be treated by immediately eating or drinking something that contains sugar with 15 grams of rapid-acting carbohydrate, such as:  · 4 oz (120 mL) of fruit juice.  · 4-6 oz (120-150 mL) of regular soda (not diet soda).  · 8 oz (240 mL) of low-fat milk.  · Several pieces of hard candy. Check food labels to find out how many to eat for 15 grams.  · 1 Tbsp (15 mL) of sugar or honey.  Treating hypoglycemia if you have diabetes  If you are alert and able to swallow safely, follow the 15:15 rule:  · Take 15 grams of a rapid-acting carbohydrate. Talk with your health care provider about how much you should take. Options for getting 15 grams of rapid-acting carbohydrate include:  ? Glucose tablets (take 4 tablets).  ? Several pieces of hard candy. Check food labels to find out how many pieces to eat for 15 grams.  ? 4 oz (120 mL) of fruit juice.  ? 4-6 oz  (120-150 mL) of regular (not diet) soda.  ? 1 Tbsp (15 mL) of honey or sugar.  · Check your blood glucose 15 minutes after you take the carbohydrate.  · If the repeat blood glucose level is still at or below 70 mg/dL (3.9 mmol/L), take 15 grams of a carbohydrate again.  · If your blood glucose level does not increase above 70 mg/dL (3.9 mmol/L) after 3 tries, seek emergency medical care.  · After your blood glucose level returns to normal, eat a meal or a snack within 1 hour.    Treating severe hypoglycemia  Severe hypoglycemia is when your blood glucose level is at or below 54 mg/dL (3 mmol/L). Severe hypoglycemia is a medical emergency. Get medical help right away.  If you have severe hypoglycemia and you cannot eat or drink, you will need to be given glucagon. A family member or close friend should learn how to check your blood glucose and how to give you glucagon. Ask your health care provider if you need to have an emergency glucagon kit available.  Severe hypoglycemia may need to be treated in a hospital. The treatment may include getting glucose through an IV. You may also need treatment for the cause of your hypoglycemia.  Follow these instructions at home:    General instructions  · Take over-the-counter and prescription medicines only as told by your health care provider.  · Monitor your blood glucose as told by your health care provider.  · If you drink alcohol:  ? Limit how much you use to:  § 0-1 drink a day for nonpregnant women.  § 0-2 drinks a day for men.  ? Be aware of how much alcohol is in your drink. In the [place] one drink equals one 12 oz bottle of beer (355 mL), one 5 oz glass of wine (148 mL), or one 1½ oz glass of hard liquor (44 mL).  · Keep all follow-up visits as told by your health care provider. This is important.  If you have diabetes:  · Always have a rapid-acting carbohydrate (15 grams) option with you to treat low blood glucose.  · Follow your diabetes management plan as directed.  Make sure you:  ? Know the symptoms of hypoglycemia. It is important to treat it right away to prevent it from becoming severe.  ? Check your blood glucose as often as told. Always check before and after exercise.  ? Always check your blood glucose before you drive a motorized vehicle.  ? Take your medicines as told.  ? Follow your meal plan. Eat on time, and do not skip meals.  · Share your diabetes management plan with people in your workplace, school, and household.  · Carry a medical alert card or wear medical alert jewelry.  Contact a health care provider if:  · You have problems keeping your blood glucose in your target range.  · You have frequent episodes of hypoglycemia.  Get help right away if:  · You continue to have hypoglycemia symptoms after eating or drinking something that contains 15 grams of fast-acting carbohydrate and you cannot get your blood glucose above 70 mg/dL (3.9 mmol/L) while following the 15:15 rule.  · Your blood glucose is at or below 54 mg/dL (3 mmol/L).  · You have a seizure.  · You faint.  These symptoms may represent a serious problem that is an emergency. Do not wait to see if the symptoms will go away. Get medical help right away. Call your local emergency services (911 in the [place] Do not drive yourself to the hospital.  Summary  · Hypoglycemia occurs when the level of sugar (glucose) in the blood is too low.  · Hypoglycemia can happen in people who have or do not have diabetes. It can develop quickly, and it can be a medical emergency.  · Make sure you know the symptoms of hypoglycemia and how to treat it.  · Always have a rapid-acting carbohydrate snack with you to treat low blood sugar.  This information is not intended to replace advice given to you by your health care provider. Make sure you discuss any questions you have with your health care provider.  Document Revised: 11/11/2020 Document Reviewed: 11/11/2020  [employer] Patient Education © 2021 [employer]    Advance  Care Planning and Advance Directives     You make decisions on a daily basis - decisions about where you want to live, your career, your home, your life. Perhaps one of the most important decisions you face is your choice for future medical care. Take time to talk with your family and your healthcare team and start planning today.  Advance Care Planning is a process that can help you:  · Understand possible future healthcare decisions in light of your own experiences  · Reflect on those decision in light of your goals and values  · Discuss your decisions with those closest to you and the healthcare professionals that care for you  · Make a plan by creating a document that reflects your wishes    Surrogate Decision Maker  In the event of a medical emergency, which has left you unable to communicate or to make your own decisions, you would need someone to make decisions for you.  It is important to discuss your preferences for medical treatment with this person while you are in good health.     Qualities of a surrogate decision maker:  • Willing to take on this role and responsibility  • Knows what you want for future medical care  • Willing to follow your wishes even if they don't agree with them  • Able to make difficult medical decisions under stressful circumstances    Advance Directives  These are legal documents you can create that will guide your healthcare team and decision maker(s) when needed. These documents can be stored in the electronic medical record.    · Living Will - a legal document to guide your care if you have a terminal condition or a serious illness and are unable to communicate. States vary by statute in document names/types, but most forms may include one or more of the following:        -  Directions regarding life-prolonging treatments        -  Directions regarding artificially provided nutrition/hydration        -  Choosing a healthcare decision maker        -  Direction regarding  organ/tissue donation    · Durable Power of  for Healthcare - this document names an -in-fact to make medical decisions for you, but it may also allow this person to make personal and financial decisions for you. Please seek the advice of an  if you need this type of document.    **Advance Directives are not required and no one may discriminate against you if you do not sign one.    Medical Orders  Many states allow specific forms/orders signed by your physician to record your wishes for medical treatment in your current state of health. This form, signed in personal communication with your physician, addresses resuscitation and other medical interventions that you may or may not want.      For more information or to schedule a time with a [facility] Advance Care Planning Facilitator contact: [email or web address] or call [phone] and someone will contact you directly.

## 2021-10-12 ENCOUNTER — TELEPHONE (OUTPATIENT)
Dept: CARDIOLOGY | Facility: CLINIC | Age: 76
End: 2021-10-12

## 2021-10-12 DIAGNOSIS — I95.9 HYPOTENSION, UNSPECIFIED HYPOTENSION TYPE: ICD-10-CM

## 2021-10-12 DIAGNOSIS — R00.1 BRADYCARDIA: ICD-10-CM

## 2021-10-12 DIAGNOSIS — R55 NEAR SYNCOPE: Primary | ICD-10-CM

## 2021-10-12 NOTE — TELEPHONE ENCOUNTER
Patient called stating that she had another episode where she almost passed out. She states that she started feeling like she was going to pass out so she got up and started to walk around and started feeling better. She states that her niece checked her BP and it was 78/40 and HR was 48. This was on Sunday. She states at the time she was feeling short of breath and dizzy, but did not have those before or after this episode.       Patient is still taking:  · Coreg 12.5 mg BID  · imdur 30 mg daily

## 2021-10-12 NOTE — TELEPHONE ENCOUNTER
Stress 6/20 negative for ischemia, carotid ultrasound negative for stenosis. I have placed order for cardiac monitor 7 days. Advise to decrease Coreg to 6.25 mg bid. Monitor VS.

## 2021-10-13 NOTE — TELEPHONE ENCOUNTER
Patient was made aware of recommendation to wear holter monitor for 7 days. Monitor will be mailed to patient's home. Patient was also made aware to decrease coreg to 6.25 mg BID. Patient states that she has a large supply of coreg 12.5 mg tablets and she will cut in half.

## 2021-12-06 ENCOUNTER — OFFICE VISIT (OUTPATIENT)
Dept: CARDIOLOGY | Facility: CLINIC | Age: 76
End: 2021-12-06

## 2021-12-06 VITALS
HEART RATE: 64 BPM | BODY MASS INDEX: 25.57 KG/M2 | SYSTOLIC BLOOD PRESSURE: 130 MMHG | WEIGHT: 149.8 LBS | TEMPERATURE: 96.8 F | HEIGHT: 64 IN | DIASTOLIC BLOOD PRESSURE: 80 MMHG

## 2021-12-06 DIAGNOSIS — R55 SYNCOPE AND COLLAPSE: Primary | ICD-10-CM

## 2021-12-06 DIAGNOSIS — I25.9 IHD (ISCHEMIC HEART DISEASE): ICD-10-CM

## 2021-12-06 DIAGNOSIS — I10 ESSENTIAL HYPERTENSION: ICD-10-CM

## 2021-12-06 DIAGNOSIS — E78.2 MIXED HYPERLIPIDEMIA: ICD-10-CM

## 2021-12-06 DIAGNOSIS — I47.1 PSVT (PAROXYSMAL SUPRAVENTRICULAR TACHYCARDIA) (HCC): ICD-10-CM

## 2021-12-06 PROBLEM — I47.10 PSVT (PAROXYSMAL SUPRAVENTRICULAR TACHYCARDIA): Status: ACTIVE | Noted: 2021-12-06

## 2021-12-06 PROCEDURE — 99214 OFFICE O/P EST MOD 30 MIN: CPT | Performed by: NURSE PRACTITIONER

## 2021-12-06 NOTE — PATIENT INSTRUCTIONS
Syncope    Syncope refers to a condition in which a person temporarily loses consciousness. Syncope may also be called fainting or passing out. It is caused by a sudden decrease in blood flow to the brain. Even though most causes of syncope are not dangerous, syncope can be a sign of a serious medical problem. Your health care provider may do tests to find the reason why you are having syncope.  Signs that you may be about to faint include:  · Feeling dizzy or light-headed.  · Feeling nauseous.  · Seeing all white or all black in your field of vision.  · Having cold, clammy skin.  If you faint, get medical help right away. Call your local emergency services (911 in the [place] Do not drive yourself to the hospital.  Follow these instructions at home:  Pay attention to any changes in your symptoms. Take these actions to stay safe and to help relieve your symptoms:  Lifestyle  · Do not drive, use machinery, or play sports until your health care provider says it is okay.  · Do not drink alcohol.  · Do not use any products that contain nicotine or tobacco, such as cigarettes and e-cigarettes. If you need help quitting, ask your health care provider.  · Drink enough fluid to keep your urine pale yellow.  General instructions  · Take over-the-counter and prescription medicines only as told by your health care provider.  · If you are taking blood pressure or heart medicine, get up slowly and take several minutes to sit and then stand. This can reduce dizziness or light-headedness.  · Have someone stay with you until you feel stable.  · If you start to feel like you might faint, lie down right away and raise (elevate) your feet above the level of your heart. Breathe deeply and steadily. Wait until all the symptoms have passed.  · Keep all follow-up visits as told by your health care provider. This is important.  Get help right away if you:  · Have a severe headache.  · Faint once or repeatedly.  · Have pain in your chest,  abdomen, or back.  · Have a very fast or irregular heartbeat (palpitations).  · Have pain when you breathe.  · Are bleeding from your mouth or rectum, or you have black or tarry stool.  · Have a seizure.  · Are confused.  · Have trouble walking.  · Have severe weakness.  · Have vision problems.  These symptoms may represent a serious problem that is an emergency. Do not wait to see if your symptoms will go away. Get medical help right away. Call your local emergency services (911 in the [place] Do not drive yourself to the hospital.  Summary  · Syncope refers to a condition in which a person temporarily loses consciousness. It is caused by a sudden decrease in blood flow to the brain.  · Signs that you may be about to faint include dizziness, feeling light-headed, feeling nauseous, sudden vision changes, or cold, clammy skin.  · Although most causes of syncope are not dangerous, syncope can be a sign of a serious medical problem. If you faint, get medical help right away.  This information is not intended to replace advice given to you by your health care provider. Make sure you discuss any questions you have with your health care provider.  Document Revised: 04/29/2021 Document Reviewed: 04/29/2021  [employer] Patient Education © 2021 [employer]  Tilt Table Test  A tilt table test is a test to find the cause of unexplained problems, such as:  · Fainting.  · Dizziness.  · Falls.  · A drop in blood pressure when standing up (orthostatic hypotension).  For this test, you will be safely secured to a table that moves you from a lying position to an upright position. Your heart rhythm and blood pressure will be monitored during the test. During and for a short time after the test, it is normal to:  · Feel light-headed or dizzy.  · Faint.  · Feel nauseous or vomit.  · Have blurry vision.  · Feel cold or clammy.  Tell a health care provider about:  · All medicines you are taking, including vitamins, herbs, eye drops,  creams, and over-the-counter medicines.  · Any surgeries you have had.  · Any medical conditions you have or have had.  · Whether you are pregnant or may be pregnant.  What are the risks?  Generally, this is a safe test with few risks or complications. A heart attack or stroke can occur in people with significant coronary or carotid artery stenosis, but this is very rare.  What happens before the test?  · Follow instructions from your health care provider about eating or drinking restrictions.  · Ask your health care provider about changing or stopping your regular medicines. This is especially important if you are taking diabetes medicines or blood thinners.  · Let your primary health care provider know that you are having this test.  · Plan to have someone take you home after the test.  What happens during the test?    · You will lie down on a table. The table will have a footboard and safety straps to keep you in place. There are two parts to this test.  · An IV will be inserted into one of your veins.  · Your blood pressure, heart rate, breathing rate, and blood oxygen level will be monitored throughout the test.  · Your heart rhythm will be monitored with a electrocardiograph (ECG).  Part one of the test  · You will be placed in an upright position by tilting the table. You will need to tell your health care provider right away if you feel dizzy or feel like you are going to faint.  · If you feel dizzy, your blood pressure drops, or your heart rate drops, you will be placed in a flat or head-down position.  · If your heart rate or blood pressure does not return to normal after that, you may be given medicine to help with symptoms of low blood pressure or a slow heart rate. The medicine may be given under your tongue or through the IV.  Part two of the test  · If you do not have symptoms when placed in an upright position, medicine may be given to cause your heart to beat faster and stronger. You may feel as if  you are exercising. The medicine may be given under your tongue or through the IV.  · You will be placed in an upright position again by tilting the table. Tell your health care provider right away if you feel dizzy or feel like you are going to faint.  · If you feel dizzy, your blood pressure drops, or your heart rate drops, you will be placed in a flat or head-down position.  · If your blood pressure does not drop after 15 minutes. The table will be lowered and the test is complete.  This test may vary among health care providers and hospitals.  What happens after the test?  · You will be monitored for up to an hour after the test is finished.  · If you fainted or lost consciousness during the test, you may need to stay for additional testing.  · Your health care provider will tell you when you can go home.  · Do not drive yourself home if you fainted during the test.  · It is up to you to get your test results. Ask your health care provider, or the department that is doing the test, when your results will be ready.  Summary  · A tilt table test is a test to find the cause of unexplained problems such as dizziness or fainting.  · Follow instructions from your health care provider about eating or drinking restrictions before the test.  · Follow instructions from your health care provider about changing or stopping your regular medicines. This is especially important if you are taking diabetes medicines or blood thinners.  · Your blood pressure, heart rate, breathing rate, and blood oxygen level will be monitored throughout the test.  · Plan to have someone take you home after the test.  This information is not intended to replace advice given to you by your health care provider. Make sure you discuss any questions you have with your health care provider.  Document Revised: 08/23/2019 Document Reviewed: 08/23/2019  [employer] Patient Education © 2021 [employer]

## 2021-12-06 NOTE — PROGRESS NOTES
Chief Complaint   Patient presents with   • Follow-up     Cardiac management   • Lab     Last labs in chart.   • Med Refill     Needs refills on Nitro sl.   • Hx of pelvis fx     She reports having pelvis fx 6/14/21, and blood clots in lungs. Was in [facility] for 4 days, took Eliquis for 3 months.     Iker Bravo is a 76 y.o. female with hypertension, hypercholesterolemia, and IHD diagnosed in January 2016 when she presented with chest pain and elevated cardiac enzymes found to have significant disease of diagonal which was stented. Stress test repeated August 2018. Small area of anterior wall ischemia noted. Long-acting nitrates added with plan for cath if she developed angina. She then called in June 2020 with dizziness, light headedness, and diaphoresis. She was advised to repeat cardiac workup.  Stress showed no ischemia, no arrhythmia, echo stable. Carotid US with no stenosis.  She apparently slipped and fell at brother's house, fractured pelvis, developed pulmonary embolism, took Eliquis for 3 months.  She called in October 2021 with dizziness.  Coreg reduced to 6.25 BID. 7-day Holter showed few PAC/PVC, 16 short SVT, longest 12 seconds.  Plan to order extended monitor if presyncopal episodes persist.    She returns today for follow-up.  She had another episode of sudden onset dizziness lightheadedness, syncope, diaphoresis.  Occurred at [employer].  Blood pressure and heart rate checked by a nurse found she was hypotensive and bradycardic.  After a few minutes symptoms resolved.  Denies chest pain, dyspnea, no significant palpitations. Labs 6/11/2021 showed H/H 13.8/40.5, GFR 62, normal electrolytes, , TRI 54, HDL 44, LDL 94, TSH 4.3, vitamin D 47.         Cardiac History:    Past Surgical History:   Procedure Laterality Date   • CARDIAC CATHETERIZATION  01/02/2016    Dr. Marks)- 99% D1- 2.25x8 JONI   • CARDIOVASCULAR STRESS TEST  02/15/2012    4 min, 86% THR. BP- 186/82.  Septal Ischemia   • CARDIOVASCULAR STRESS TEST  12/17/2015    8 min 31 sec. 85% THR. Anterior Ischemia   • CARDIOVASCULAR STRESS TEST  08/15/2018    7 Min. 20 Secs.9.0 METS. 88% THR. BP- 180/83. Small anterior Ischemia.   • CARDIOVASCULAR STRESS TEST  06/25/2020    6 Min. 7.0 METS. 88% THR. BP- 180/84. EF 72% Negative.   • CONVERTED (HISTORICAL) HOLTER  11/02/2021    <7 Days. Avg 71. . 16 SVT. Longest 12.2 Secs   • ECHO - CONVERTED  02/24/2012    Echo- EF 65%   • ECHO - CONVERTED  12/17/2015    Echo- EF 65%. RVSP- 45 mmHg   • ECHO - CONVERTED  01/01/2016    Echo- ([facility]. Dr. Marks) EF 65%. RVSP- 36 mmHg   • ECHO - CONVERTED  08/15/2018    EF 65%. Mild MR & AI. RVSP- 37 mmHg.   • ECHO - CONVERTED  06/25/2020    EF 65%. LA- 4.2 Cm. Mild MR & AI   • US CAROTID UNILATERAL  06/25/2020    Neg for flow limiting stenosis bilaterally     Current Outpatient Medications   Medication Sig Dispense Refill   • acetaminophen (TYLENOL) 325 MG tablet Take 650 mg by mouth every 4 (four) hours as needed for mild pain (1-3).     • aspirin 81 MG EC tablet Take 81 mg by mouth daily.     • atorvastatin (LIPITOR) 20 MG tablet Take 20 mg by mouth every night.     • carvedilol (COREG) 12.5 MG tablet Take 1 tablet by mouth 2 (Two) Times a Day. (Patient taking differently: Take 6.25 mg by mouth 2 (Two) Times a Day.) 180 tablet 2   • cetirizine (ZyrTEC) 10 MG tablet Take 10 mg by mouth daily.     • cholecalciferol (VITAMIN D3) 1000 UNITS tablet Take 1,000 Units by mouth daily.     • isosorbide mononitrate (IMDUR) 30 MG 24 hr tablet Take 1 tablet by mouth Daily. 30 tablet 11   • meloxicam (MOBIC) 15 MG tablet Take 15 mg by mouth As Needed.     • Multiple Vitamin (MULTI VITAMIN DAILY PO) Take 1 tablet by mouth daily.     • nitroglycerin (NITROSTAT) 0.4 MG SL tablet Place 1 tablet under the tongue Every 5 (Five) Minutes As Needed for Chest Pain. Take no more than 3 doses in 15 minutes. 25 tablet 1   • omeprazole (priLOSEC) 20 MG capsule  "Take 20 mg by mouth Daily.       No current facility-administered medications for this visit.     Compazine [prochlorperazine edisylate], Codeine, Penicillins, and Sulfa antibiotics    Past Medical History:   Diagnosis Date   • GERD (gastroesophageal reflux disease)    • H/O colonoscopy with polypectomy    • History of back surgery    • History of breast biopsy     benign   • Hypercholesteremia    • Osteoporosis      Social History     Socioeconomic History   • Marital status:    Tobacco Use   • Smoking status: Never Smoker   • Smokeless tobacco: Never Used   Vaping Use   • Vaping Use: Never used   Substance and Sexual Activity   • Alcohol use: No   • Drug use: No     Family History   Problem Relation Age of Onset   • Heart failure Mother    • Atrial fibrillation Mother    • Heart attack Maternal Grandfather    • Heart attack Other    • Heart attack Brother 68     Review of Systems   Constitutional: Negative for decreased appetite and malaise/fatigue.   HENT: Negative.    Eyes: Negative for blurred vision.   Cardiovascular: Negative for chest pain, dyspnea on exertion, leg swelling, palpitations and syncope.   Respiratory: Negative for shortness of breath and sleep disturbances due to breathing.    Endocrine: Negative.    Hematologic/Lymphatic: Negative for bleeding problem. Does not bruise/bleed easily.   Skin: Negative.    Musculoskeletal: Negative for falls and myalgias.   Gastrointestinal: Negative for abdominal pain, heartburn and melena.   Genitourinary: Negative for hematuria.   Neurological: Negative for dizziness and light-headedness.   Psychiatric/Behavioral: Negative for altered mental status.   Allergic/Immunologic: Negative.       Objective     /80 (BP Location: Right arm)   Pulse 64   Temp 96.8 °F (36 °C)   Ht 162 cm (63.78\")   Wt 67.9 kg (149 lb 12.8 oz)   BMI 25.89 kg/m²     Vitals and nursing note reviewed.   Constitutional:       General: Not in acute distress.     Appearance: " Well-developed. Not diaphoretic.   Eyes:      Pupils: Pupils are equal, round, and reactive to light.   HENT:      Head: Normocephalic.   Pulmonary:      Effort: Pulmonary effort is normal. No respiratory distress.      Breath sounds: Normal breath sounds.   Cardiovascular:      Normal rate. Regular rhythm.   Pulses:     Intact distal pulses.   Edema:     Peripheral edema absent.   Abdominal:      General: Bowel sounds are normal.      Palpations: Abdomen is soft.   Musculoskeletal: Normal range of motion.      Cervical back: Normal range of motion. Skin:     General: Skin is warm and dry.   Neurological:      Mental Status: Alert and oriented to person, place, and time.        Procedures          Problem List Items Addressed This Visit        Cardiac and Vasculature    Hyperlipemia    Relevant Orders    Tilt Table    IHD (ischemic heart disease)    Relevant Orders    Tilt Table    Essential hypertension    Relevant Orders    Tilt Table    PSVT (paroxysmal supraventricular tachycardia) (HCC)    Relevant Orders    Tilt Table      Other Visit Diagnoses     Syncope and collapse    -  Primary    Relevant Orders    Tilt Table         1.  CAD-S/P stenting of the diagonal in 2016, stress test June 2020 showed no ischemia.  She denies anginal symptoms.  Continue aspirin, statin, beta-blocker, Imdur.    2.  HTN-well controlled with carvedilol.    3.  Hyperlipidemia-lipids stable with Lipitor 20 mg, continue the same.    4.  Presyncope/syncope-questionable vasovagal versus arrhythmia/SVT.  She will be referred for tilt table test.  If normal, will plan to refer to EP for further management of SVT.    5.  PSVT-continue Coreg.  16 short episodes of SVT, longest was 12 seconds.  We discussed this in detail.  Less likely this would cause syncope due to brief nature but possible.    Patient's Body mass index is 25.89 kg/m². indicating that she is within normal range (BMI 18.5-24.9). No BMI management plan needed..                Electronically signed by IZZY Villasenor,  December 10, 2021 11:32 EST

## 2021-12-28 ENCOUNTER — HOSPITAL ENCOUNTER (OUTPATIENT)
Dept: CARDIOLOGY | Facility: HOSPITAL | Age: 76
Discharge: HOME | End: 2021-12-28
Admitting: NURSE PRACTITIONER

## 2021-12-28 VITALS
DIASTOLIC BLOOD PRESSURE: 88 MMHG | OXYGEN SATURATION: 95 % | SYSTOLIC BLOOD PRESSURE: 126 MMHG | HEART RATE: 60 BPM | RESPIRATION RATE: 12 BRPM

## 2021-12-28 DIAGNOSIS — I10 ESSENTIAL HYPERTENSION: ICD-10-CM

## 2021-12-28 DIAGNOSIS — I25.9 IHD (ISCHEMIC HEART DISEASE): ICD-10-CM

## 2021-12-28 DIAGNOSIS — R55 SYNCOPE AND COLLAPSE: ICD-10-CM

## 2021-12-28 DIAGNOSIS — I47.1 PSVT (PAROXYSMAL SUPRAVENTRICULAR TACHYCARDIA) (HCC): ICD-10-CM

## 2021-12-28 DIAGNOSIS — E78.2 MIXED HYPERLIPIDEMIA: ICD-10-CM

## 2021-12-28 PROCEDURE — 93660 TILT TABLE EVALUATION: CPT | Performed by: INTERNAL MEDICINE

## 2021-12-28 PROCEDURE — 93660 TILT TABLE EVALUATION: CPT

## 2021-12-29 LAB
MAXIMAL PREDICTED HEART RATE: 146 BPM
STRESS TARGET HR: 124 BPM

## 2022-03-14 DIAGNOSIS — I10 ESSENTIAL HYPERTENSION: ICD-10-CM

## 2022-03-15 RX ORDER — CARVEDILOL 12.5 MG/1
TABLET ORAL
Qty: 180 TABLET | Refills: 2 | Status: SHIPPED | OUTPATIENT
Start: 2022-03-15 | End: 2022-12-21 | Stop reason: SDUPTHER

## 2022-06-16 ENCOUNTER — OFFICE VISIT (OUTPATIENT)
Dept: CARDIOLOGY | Facility: CLINIC | Age: 77
End: 2022-06-16

## 2022-06-16 VITALS
DIASTOLIC BLOOD PRESSURE: 76 MMHG | SYSTOLIC BLOOD PRESSURE: 120 MMHG | WEIGHT: 147 LBS | HEART RATE: 74 BPM | BODY MASS INDEX: 25.1 KG/M2 | HEIGHT: 64 IN

## 2022-06-16 DIAGNOSIS — I25.9 IHD (ISCHEMIC HEART DISEASE): Primary | ICD-10-CM

## 2022-06-16 DIAGNOSIS — I47.1 PSVT (PAROXYSMAL SUPRAVENTRICULAR TACHYCARDIA): ICD-10-CM

## 2022-06-16 DIAGNOSIS — E78.2 MIXED HYPERLIPIDEMIA: ICD-10-CM

## 2022-06-16 DIAGNOSIS — I10 ESSENTIAL HYPERTENSION: ICD-10-CM

## 2022-06-16 PROCEDURE — 99214 OFFICE O/P EST MOD 30 MIN: CPT | Performed by: NURSE PRACTITIONER

## 2022-06-16 NOTE — PROGRESS NOTES
Chief Complaint   Patient presents with   • Follow-up     Cardiac management   • LABS     Results January 2022 on chart   • Hypotension     Had previously  been experiencing low  BP and Coreg has been decrease to half  tablet  BID. She asked if ok to continue on decreased dose   • Med Refill     No refills needed today       Subjective       Fidelina Bravo is a 76 y.o. female with hypertension, hypercholesterolemia, and IHD diagnosed in January 2016 when she presented with chest pain and elevated cardiac enzymes found to have significant disease of diagonal which was stented. Stress test repeated August 2018. Small area of anterior wall ischemia noted. Long-acting nitrates added with plan for cath if she developed angina. She then called in June 2020 with dizziness, light headedness, and diaphoresis. She was advised to repeat cardiac workup.  Stress showed no ischemia, no arrhythmia, echo stable. Carotid US with no stenosis.  She apparently slipped and fell at brother's house, fractured pelvis, developed pulmonary embolism, took Eliquis for 3 months.  She called in October 2021 with dizziness.  Coreg reduced to 6.25 BID. 7-day Holter showed few PAC/PVC, 16 short SVT, longest 12 seconds.  Plan to order extended monitor if presyncopal episodes persist.    At December 2021 visit she had experienced reoccurrence of syncope. Tilt table test ordered. Results were negative.     Today she returns for routine visit and reports the following:      Hypertension  Her carvedilol dose was decreased for her blood pressure and heart rate. She states that she is doing better. Her blood pressure and heart rate readings are great today. She denies any syncopal episodes. The patient is trying to drink more water. She states that she does not need a prescription for the lower dose of the carvedilol, because she uses a pill splitter at home.     Previously her syncopal episodes would happen when she was sedentary. Her first symptoms  "were blurred vision accompanied with little flashes of light, headache, and a \" funny feeling \". If she would be able to ambulate and drink some fluids prior to fainting, the episode would resolve itself. The patient has not had glucose tolerance testing performed in the past to check if her blood glucose had decreased significantly. She notes that she tries to eat peanut butter, because it helps to keep her stabilized. She denies having any numbness or weakness on one side of the body. The patient has not had any double vision or blurred vision. She states that she becomes a little dizzy when she bends over, but this has been a problem for many years. When she feels a little dizzy and has head congestion, she needs to lay down for a while to resolve the problem.     Cardiac History:      Past Surgical History:   Procedure Laterality Date   • CARDIAC CATHETERIZATION  01/02/2016    Dr. Marks)- 99% D1- 2.25x8 JONI   • CARDIOVASCULAR STRESS TEST  02/15/2012    4 min, 86% THR. BP- 186/82. Septal Ischemia   • CARDIOVASCULAR STRESS TEST  12/17/2015    8 min 31 sec. 85% THR. Anterior Ischemia   • CARDIOVASCULAR STRESS TEST  08/15/2018    7 Min. 20 Secs.9.0 METS. 88% THR. BP- 180/83. Small anterior Ischemia.   • CARDIOVASCULAR STRESS TEST  06/25/2020    6 Min. 7.0 METS. 88% THR. BP- 180/84. EF 72% Negative.   • CONVERTED (HISTORICAL) HOLTER  11/02/2021    <7 Days. Avg 71. . 16 SVT. Longest 12.2 Secs   • ECHO - CONVERTED  02/24/2012    Echo- EF 65%   • ECHO - CONVERTED  12/17/2015    Echo- EF 65%. RVSP- 45 mmHg   • ECHO - CONVERTED  01/01/2016    Echo- ([facility]. Dr. Marks) EF 65%. RVSP- 36 mmHg   • ECHO - CONVERTED  08/15/2018    EF 65%. Mild MR & AI. RVSP- 37 mmHg.   • ECHO - CONVERTED  06/25/2020    EF 65%. LA- 4.2 Cm. Mild MR & AI   • US CAROTID UNILATERAL  06/25/2020    Neg for flow limiting stenosis bilaterally       Current Outpatient Medications   Medication Sig Dispense Refill   • acetaminophen (TYLENOL) 325 " MG tablet Take 650 mg by mouth every 4 (four) hours as needed for mild pain (1-3).     • aspirin 81 MG EC tablet Take 81 mg by mouth daily.     • atorvastatin (LIPITOR) 20 MG tablet Take 20 mg by mouth every night.     • carvedilol (COREG) 12.5 MG tablet TAKE 1 TABLET TWICE DAILY (Patient taking differently: 6.25 mg.) 180 tablet 2   • cetirizine (ZyrTEC) 10 MG tablet Take 10 mg by mouth daily.     • cholecalciferol (VITAMIN D3) 1000 UNITS tablet Take 1,000 Units by mouth daily.     • isosorbide mononitrate (IMDUR) 30 MG 24 hr tablet Take 1 tablet by mouth Daily. 30 tablet 11   • meloxicam (MOBIC) 15 MG tablet Take 15 mg by mouth As Needed.     • Multiple Vitamin (MULTI VITAMIN DAILY PO) Take 1 tablet by mouth daily.     • nitroglycerin (NITROSTAT) 0.4 MG SL tablet Place 1 tablet under the tongue Every 5 (Five) Minutes As Needed for Chest Pain. Take no more than 3 doses in 15 minutes. 25 tablet 1   • omeprazole (priLOSEC) 20 MG capsule Take 20 mg by mouth Daily.       No current facility-administered medications for this visit.       Compazine [prochlorperazine edisylate], Codeine, Penicillins, and Sulfa antibiotics    Past Medical History:   Diagnosis Date   • GERD (gastroesophageal reflux disease)    • H/O colonoscopy with polypectomy    • History of back surgery    • History of breast biopsy     benign   • Hypercholesteremia    • Osteoporosis        Social History     Socioeconomic History   • Marital status:    Tobacco Use   • Smoking status: Never Smoker   • Smokeless tobacco: Never Used   Vaping Use   • Vaping Use: Never used   Substance and Sexual Activity   • Alcohol use: No   • Drug use: No       Family History   Problem Relation Age of Onset   • Heart failure Mother    • Atrial fibrillation Mother    • Heart attack Maternal Grandfather    • Heart attack Other    • Heart attack Brother 68       Review of Systems   Constitutional: Negative for decreased appetite, diaphoresis and malaise/fatigue.  "  HENT: Positive for congestion (sinus). Negative for nosebleeds.    Eyes: Negative for blurred vision.   Cardiovascular: Negative for chest pain, claudication, cyanosis, dyspnea on exertion, irregular heartbeat, leg swelling, near-syncope, orthopnea, palpitations, paroxysmal nocturnal dyspnea and syncope.   Respiratory: Negative for shortness of breath.    Endocrine: Negative for cold intolerance and heat intolerance.   Hematologic/Lymphatic: Does not bruise/bleed easily.   Skin: Negative for rash.   Musculoskeletal: Negative for myalgias.   Gastrointestinal: Negative for heartburn, melena and nausea.   Genitourinary: Negative for dysuria and hematuria.   Neurological: Positive for dizziness (only with sinus congestion). Negative for light-headedness.   Psychiatric/Behavioral: The patient does not have insomnia and is not nervous/anxious.         BP Readings from Last 5 Encounters:   06/16/22 120/76   12/28/21 126/88   12/06/21 130/80   05/18/21 122/76   11/17/20 120/80       Wt Readings from Last 5 Encounters:   06/16/22 66.7 kg (147 lb)   12/06/21 67.9 kg (149 lb 12.8 oz)   05/18/21 65.8 kg (145 lb)   11/17/20 64.4 kg (142 lb)   06/25/20 66.2 kg (145 lb 15.1 oz)       Objective      Labs 1/4/2022: Vitamin D49.4, TSH 4.12, total cholesterol 165, triglycerides 85, HDL 47, , glucose 107, BUN 15, creatinine 0.85, GFR 68, sodium 142, potassium 5.2, chloride 107, carbon oxide 19, calcium 9.5, total protein 7, albumin 4.4, globulin 2.6, total bili 0.5, ALP 21, AST 34, ALT 26    Labs 6/11/2021 showed H/H 13.8/40.5, GFR 62, normal electrolytes, , TRI 54, HDL 44, LDL 94, TSH 4.3, vitamin D 47    /76 (BP Location: Left arm)   Pulse 74   Ht 162 cm (63.78\")   Wt 66.7 kg (147 lb)   BMI 25.41 kg/m²     Vitals and nursing note reviewed.   Eyes:      Pupils: Pupils are equal, round, and reactive to light.   HENT:      Head: Normocephalic.   Pulmonary:      Breath sounds: Normal breath sounds. "   Cardiovascular:      Normal rate. Regular rhythm.      Murmurs: There is a grade 1 to 2/6 low frequency systolic murmur.   Pulses:     Intact distal pulses.   Edema:     Peripheral edema absent.   Abdominal:      General: Bowel sounds are normal.      Palpations: Abdomen is soft.   Musculoskeletal: Normal range of motion.      Cervical back: Normal range of motion. Skin:     General: Skin is warm.   Neurological:      Mental Status: Alert and oriented to person, place, and time.        Procedures: none today          Assessment & Plan   Diagnoses and all orders for this visit:    1. IHD (ischemic heart disease) (Primary)    2. PSVT (paroxysmal supraventricular tachycardia) (HCC)    3. Essential hypertension    4. Mixed hyperlipidemia      IHD  -Currently anginal symptoms denied.  Continue statin, nitrate, and aspirin.  Nitrostat is up-to-date.  - At next visit we will consider repeat nuclear stress test due to cardiac history and length of time.    PSVT  - Recent palpitations denied.  Continue beta-blocker at lower dose being 6.25 mg twice daily.  Patient will continue to monitor vital signs and symptoms.  Call for concerns.  If she has recurrence of palpitations associated with symptoms or recurrence of syncopal episode she understands to contact the office and extended monitor will be ordered.  - Continue to avoid caffeine and maintain good hydration.    Hypertension  -BP well controlled.  Continue current antihypertensive management    Hyperlipidemia  - January labs shows LDL at 102.  Continue statin therapy along with diet.  If LDL trend continues to mildly increase then consider Lipitor at 40 mg or changing to Crestor.  A 6-month follow-up visit scheduled.  Please call sooner for chronic concerns.                   Transcribed from ambient dictation for IZZY oPwell by Mariluz Landis.  06/16/22   18:21 EDT    Patient verbalized consent to the visit recording.

## 2022-12-21 ENCOUNTER — OFFICE VISIT (OUTPATIENT)
Dept: CARDIOLOGY | Facility: CLINIC | Age: 77
End: 2022-12-21

## 2022-12-21 VITALS
SYSTOLIC BLOOD PRESSURE: 110 MMHG | HEART RATE: 70 BPM | HEIGHT: 64 IN | DIASTOLIC BLOOD PRESSURE: 60 MMHG | WEIGHT: 148.2 LBS | BODY MASS INDEX: 25.3 KG/M2

## 2022-12-21 DIAGNOSIS — R07.89 CHEST DISCOMFORT: ICD-10-CM

## 2022-12-21 DIAGNOSIS — R55 NEAR SYNCOPE: ICD-10-CM

## 2022-12-21 DIAGNOSIS — I25.9 IHD (ISCHEMIC HEART DISEASE): ICD-10-CM

## 2022-12-21 DIAGNOSIS — I10 ESSENTIAL HYPERTENSION: ICD-10-CM

## 2022-12-21 DIAGNOSIS — E78.2 MIXED HYPERLIPIDEMIA: ICD-10-CM

## 2022-12-21 DIAGNOSIS — I25.118 CORONARY ARTERY DISEASE INVOLVING NATIVE CORONARY ARTERY OF NATIVE HEART WITH OTHER FORM OF ANGINA PECTORIS: Primary | ICD-10-CM

## 2022-12-21 DIAGNOSIS — I47.1 PSVT (PAROXYSMAL SUPRAVENTRICULAR TACHYCARDIA): ICD-10-CM

## 2022-12-21 PROCEDURE — 99214 OFFICE O/P EST MOD 30 MIN: CPT | Performed by: NURSE PRACTITIONER

## 2022-12-21 RX ORDER — CARVEDILOL 12.5 MG/1
12.5 TABLET ORAL 2 TIMES DAILY WITH MEALS
COMMUNITY
End: 2023-01-26 | Stop reason: ALTCHOICE

## 2022-12-21 NOTE — PROGRESS NOTES
Chief Complaint   Patient presents with   • Follow-up     Cardiac management. Has no cardiac complaints today.    • LABS     December 2022 results on chart    • Med Refill     No refills needed today.  Had med list today.       Subjective       Fidelina Bravo is a 77 y.o. female with hypertension, hypercholesterolemia, and IHD diagnosed in January 2016 when she presented with chest pain and elevated cardiac enzymes found to have significant disease of diagonal which was stented. Stress test repeated August 2018. Small area of anterior wall ischemia noted. Long-acting nitrates added with plan for cath if she developed angina. She then called in June 2020 with dizziness, light headedness, and diaphoresis. She was advised to repeat cardiac workup.  Stress showed no ischemia, no arrhythmia, echo stable. Carotid US with no stenosis.  She apparently slipped and fell at brother's house, fractured pelvis, developed pulmonary embolism, took Eliquis for 3 months.  She called in October 2021 with dizziness.  Coreg reduced to 6.25 BID. 7-day Holter showed few PAC/PVC, 16 short SVT, longest 12 seconds.  Plan to order extended monitor if presyncopal episodes persist. At December 2021 visit she had experienced reoccurrence of syncope. Tilt table test ordered. Results were negative.     Today she returns to the office for follow-up visit.  She admits to some episodes of near syncope which she has had in the past and unable to find causative factor.  She is also developed some mild midsternal chest discomfort which is a new symptom since last visit.    Cardiac History:    Past Surgical History:   Procedure Laterality Date   • CARDIAC CATHETERIZATION  01/02/2016    Dr. Marks)- 99% D1- 2.25x8 JNOI   • CARDIOVASCULAR STRESS TEST  02/15/2012    4 min, 86% THR. BP- 186/82. Septal Ischemia   • CARDIOVASCULAR STRESS TEST  12/17/2015    8 min 31 sec. 85% THR. Anterior Ischemia   • CARDIOVASCULAR STRESS TEST  08/15/2018    7 Min. 20  Secs.9.0 METS. 88% THR. BP- 180/83. Small anterior Ischemia.   • CARDIOVASCULAR STRESS TEST  06/25/2020    6 Min. 7.0 METS. 88% THR. BP- 180/84. EF 72% Negative.   • CONVERTED (HISTORICAL) HOLTER  11/02/2021    <7 Days. Avg 71. . 16 SVT. Longest 12.2 Secs   • ECHO - CONVERTED  02/24/2012    Echo- EF 65%   • ECHO - CONVERTED  12/17/2015    Echo- EF 65%. RVSP- 45 mmHg   • ECHO - CONVERTED  01/01/2016    Echo- ([facility]. Dr. Marks) EF 65%. RVSP- 36 mmHg   • ECHO - CONVERTED  08/15/2018    EF 65%. Mild MR & AI. RVSP- 37 mmHg.   • ECHO - CONVERTED  06/25/2020    EF 65%. LA- 4.2 Cm. Mild MR & AI   • US CAROTID UNILATERAL  06/25/2020    Neg for flow limiting stenosis bilaterally       Current Outpatient Medications   Medication Sig Dispense Refill   • acetaminophen (TYLENOL) 325 MG tablet Take 650 mg by mouth every 4 (four) hours as needed for mild pain (1-3).     • aspirin 81 MG EC tablet Take 81 mg by mouth daily.     • atorvastatin (LIPITOR) 20 MG tablet Take 20 mg by mouth every night.     • carvedilol (COREG) 12.5 MG tablet Take 12.5 mg by mouth 2 (Two) Times a Day With Meals.     • cetirizine (ZyrTEC) 10 MG tablet Take 10 mg by mouth daily.     • cholecalciferol (VITAMIN D3) 1000 UNITS tablet Take 1,000 Units by mouth daily.     • isosorbide mononitrate (IMDUR) 30 MG 24 hr tablet Take 1 tablet by mouth Daily. 30 tablet 11   • meloxicam (MOBIC) 15 MG tablet Take 15 mg by mouth As Needed.     • Multiple Vitamin (MULTI VITAMIN DAILY PO) Take 1 tablet by mouth daily.     • nitroglycerin (NITROSTAT) 0.4 MG SL tablet Place 1 tablet under the tongue Every 5 (Five) Minutes As Needed for Chest Pain. Take no more than 3 doses in 15 minutes. 25 tablet 1   • omeprazole (priLOSEC) 20 MG capsule Take 20 mg by mouth Daily.       No current facility-administered medications for this visit.       Compazine [prochlorperazine edisylate], Codeine, Penicillins, and Sulfa antibiotics    Past Medical History:   Diagnosis Date   • GERD  (gastroesophageal reflux disease)    • H/O colonoscopy with polypectomy    • History of back surgery    • History of breast biopsy     benign   • Hypercholesteremia    • Osteoporosis        Social History     Socioeconomic History   • Marital status:    Tobacco Use   • Smoking status: Never   • Smokeless tobacco: Never   Vaping Use   • Vaping Use: Never used   Substance and Sexual Activity   • Alcohol use: No   • Drug use: No       Family History   Problem Relation Age of Onset   • Heart failure Mother    • Atrial fibrillation Mother    • Heart attack Maternal Grandfather    • Heart attack Other    • Heart attack Brother 68       Review of Systems   Constitutional: Positive for malaise/fatigue (at times, r/t age, no worse).   HENT: Negative for congestion and hoarse voice.    Cardiovascular: Positive for chest pain and near-syncope.   Respiratory: Negative for shortness of breath and sleep disturbances due to breathing.    Endocrine: Negative for polydipsia, polyphagia and polyuria.   Hematologic/Lymphatic: Negative for bleeding problem. Does not bruise/bleed easily.   Skin: Negative for color change and rash.   Musculoskeletal: Negative for falls and myalgias.   Gastrointestinal: Negative for change in bowel habit, heartburn and nausea.   Genitourinary: Negative for dysuria and hematuria.   Neurological: Positive for light-headedness. Negative for weakness.   Psychiatric/Behavioral: Negative for memory loss. The patient does not have insomnia.    Allergic/Immunologic: Negative for persistent infections.        BP Readings from Last 5 Encounters:   12/21/22 110/60   06/16/22 120/76   12/28/21 126/88   12/06/21 130/80   05/18/21 122/76       Wt Readings from Last 5 Encounters:   12/21/22 67.2 kg (148 lb 3.2 oz)   06/16/22 66.7 kg (147 lb)   12/06/21 67.9 kg (149 lb 12.8 oz)   05/18/21 65.8 kg (145 lb)   11/17/20 64.4 kg (142 lb)       Objective      Labs 12/9/2022 per PCP: CBC unremarkable, vitamin D 37.9,  "total cholesterol 164, triglycerides 104, HDL 44, , CMP unremarkable    Labs 1/4/2022: Vitamin D49.4, TSH 4.12, total cholesterol 165, triglycerides 85, HDL 47, , glucose 107, BUN 15, creatinine 0.85, GFR 68, sodium 142, potassium 5.2, chloride 107, carbon oxide 19, calcium 9.5, total protein 7, albumin 4.4, globulin 2.6, total bili 0.5, ALP 21, AST 34, ALT 26     Labs 6/11/2021 showed H/H 13.8/40.5, GFR 62, normal electrolytes, , TRI 54, HDL 44, LDL 94, TSH 4.3, vitamin D 47    /60 (BP Location: Left arm, Patient Position: Sitting)   Pulse 70   Ht 162 cm (63.78\")   Wt 67.2 kg (148 lb 3.2 oz)   BMI 25.61 kg/m²     Vitals and nursing note reviewed.   Constitutional:       Appearance: Healthy appearance. Not in distress.   Eyes:      Conjunctiva/sclera: Conjunctivae normal.      Pupils: Pupils are equal, round, and reactive to light.   HENT:      Head: Normocephalic.   Neck:      Vascular: No carotid bruit.   Pulmonary:      Effort: Pulmonary effort is normal.      Breath sounds: Normal breath sounds.   Cardiovascular:      PMI at left midclavicular line. Normal rate. Regular rhythm.      Murmurs: There is a grade 1 to 2/6 low frequency systolic murmur.   Pulses:     Intact distal pulses.   Edema:     Peripheral edema absent.   Abdominal:      General: Bowel sounds are normal.      Palpations: Abdomen is soft.   Musculoskeletal: Normal range of motion.      Cervical back: Normal range of motion and neck supple. Skin:     General: Skin is warm and dry.   Neurological:      Mental Status: Alert, oriented to person, place, and time and oriented to person, place and time.          Procedures: none today          Assessment & Plan   Diagnoses and all orders for this visit:    1. Coronary artery disease involving native coronary artery of native heart with other form of angina pectoris (HCC) (Primary)  -     Stress Test With Myocardial Perfusion One Day; Future  -     Adult Transthoracic Echo " Complete W/ Cont if Necessary Per Protocol; Future    2. IHD (ischemic heart disease)  -     Stress Test With Myocardial Perfusion One Day; Future  -     Adult Transthoracic Echo Complete W/ Cont if Necessary Per Protocol; Future    3. PSVT (paroxysmal supraventricular tachycardia) ([facility])    4. Essential hypertension  -     Stress Test With Myocardial Perfusion One Day; Future  -     Adult Transthoracic Echo Complete W/ Cont if Necessary Per Protocol; Future    5. Mixed hyperlipidemia  -     Stress Test With Myocardial Perfusion One Day; Future  -     Adult Transthoracic Echo Complete W/ Cont if Necessary Per Protocol; Future    6. Near syncope  -     Stress Test With Myocardial Perfusion One Day; Future  -     Adult Transthoracic Echo Complete W/ Cont if Necessary Per Protocol; Future    7. Chest discomfort  -     Stress Test With Myocardial Perfusion One Day; Future  -     Adult Transthoracic Echo Complete W/ Cont if Necessary Per Protocol; Future       IHD  - Patient has developed anginal symptoms.  Stress test and echocardiogram ordered to relook for stenosis and overall cardiac function.  - Continue statin, nitrate, and aspirin.  Nitrostat is up-to-date..     PSVT  - No significant palpitations noted.  -Continue beta-blocker  -If cardiac work-up negative then consider extended monitor for surveillance of arrhythmia causing possible near syncopal episodes.  Continue to avoid caffeine and maintain good hydration    Hypertension  -BP controlled  -Continue antihypertensive agents    Hyperlipidemia  -Thank you for sending copy of recent labs.  LDL similar to prior at 101.  -Continue statin and diet  -Complemented patient on maintaining BMI normal range.    A 6-month follow-up visit scheduled.  Please call sooner for concerns.

## 2023-01-24 ENCOUNTER — HOSPITAL ENCOUNTER (OUTPATIENT)
Dept: CARDIOLOGY | Facility: HOSPITAL | Age: 78
Discharge: HOME | End: 2023-01-24
Payer: MEDICARE

## 2023-01-24 VITALS — HEIGHT: 64 IN | WEIGHT: 148.15 LBS | BODY MASS INDEX: 25.29 KG/M2

## 2023-01-24 DIAGNOSIS — R55 NEAR SYNCOPE: ICD-10-CM

## 2023-01-24 DIAGNOSIS — I10 ESSENTIAL HYPERTENSION: ICD-10-CM

## 2023-01-24 DIAGNOSIS — I25.9 IHD (ISCHEMIC HEART DISEASE): ICD-10-CM

## 2023-01-24 DIAGNOSIS — I25.118 CORONARY ARTERY DISEASE INVOLVING NATIVE CORONARY ARTERY OF NATIVE HEART WITH OTHER FORM OF ANGINA PECTORIS: ICD-10-CM

## 2023-01-24 DIAGNOSIS — R07.89 CHEST DISCOMFORT: ICD-10-CM

## 2023-01-24 DIAGNOSIS — E78.2 MIXED HYPERLIPIDEMIA: ICD-10-CM

## 2023-01-24 LAB
AORTIC DIMENSIONLESS INDEX: 0.66 (DI)
BH CV ECHO MEAS - ACS: 1.72 CM
BH CV ECHO MEAS - AO MAX PG: 5.8 MMHG
BH CV ECHO MEAS - AO MEAN PG: 3.2 MMHG
BH CV ECHO MEAS - AO ROOT DIAM: 3 CM
BH CV ECHO MEAS - AO V2 MAX: 120.8 CM/SEC
BH CV ECHO MEAS - AO V2 VTI: 30.3 CM
BH CV ECHO MEAS - EDV(CUBED): 87.4 ML
BH CV ECHO MEAS - EF_3D-VOL: 63 %
BH CV ECHO MEAS - ESV(CUBED): 23.6 ML
BH CV ECHO MEAS - FS: 35.3 %
BH CV ECHO MEAS - IVS/LVPW: 0.96 CM
BH CV ECHO MEAS - IVSD: 0.95 CM
BH CV ECHO MEAS - LA DIMENSION: 3.8 CM
BH CV ECHO MEAS - LAT PEAK E' VEL: 7.2 CM/SEC
BH CV ECHO MEAS - LV MASS(C)D: 142.3 GRAMS
BH CV ECHO MEAS - LV MAX PG: 2.6 MMHG
BH CV ECHO MEAS - LV MEAN PG: 1.31 MMHG
BH CV ECHO MEAS - LV V1 MAX: 80.5 CM/SEC
BH CV ECHO MEAS - LV V1 VTI: 21.9 CM
BH CV ECHO MEAS - LVIDD: 4.4 CM
BH CV ECHO MEAS - LVIDS: 2.9 CM
BH CV ECHO MEAS - LVPWD: 0.98 CM
BH CV ECHO MEAS - MED PEAK E' VEL: 6.3 CM/SEC
BH CV ECHO MEAS - MR MAX PG: 29.3 MMHG
BH CV ECHO MEAS - MR MAX VEL: 270.5 CM/SEC
BH CV ECHO MEAS - MV A MAX VEL: 73.8 CM/SEC
BH CV ECHO MEAS - MV DEC SLOPE: 349.5 CM/SEC2
BH CV ECHO MEAS - MV DEC TIME: 0.2 MSEC
BH CV ECHO MEAS - MV E MAX VEL: 99.4 CM/SEC
BH CV ECHO MEAS - MV E/A: 1.35
BH CV ECHO MEAS - MV MAX PG: 5.6 MMHG
BH CV ECHO MEAS - MV MEAN PG: 1.85 MMHG
BH CV ECHO MEAS - MV P1/2T: 103.6 MSEC
BH CV ECHO MEAS - MV V2 VTI: 40.1 CM
BH CV ECHO MEAS - MVA(P1/2T): 2.12 CM2
BH CV ECHO MEAS - PA V2 MAX: 81.1 CM/SEC
BH CV ECHO MEAS - PI END-D VEL: 128.5 CM/SEC
BH CV ECHO MEAS - RAP SYSTOLE: 8 MMHG
BH CV ECHO MEAS - RV MAX PG: 1.95 MMHG
BH CV ECHO MEAS - RV V1 MAX: 69.8 CM/SEC
BH CV ECHO MEAS - RV V1 VTI: 15.2 CM
BH CV ECHO MEAS - RVDD: 2.7 CM
BH CV ECHO MEAS - RVSP: 33.2 MMHG
BH CV ECHO MEAS - TAPSE (>1.6): 1.85 CM
BH CV ECHO MEAS - TR MAX PG: 25.2 MMHG
BH CV ECHO MEAS - TR MAX VEL: 251.2 CM/SEC
BH CV ECHO MEASUREMENTS AVERAGE E/E' RATIO: 14.73
BH CV REST NUCLEAR ISOTOPE DOSE: 10 MCI
BH CV STRESS NUCLEAR ISOTOPE DOSE: 30 MCI
BH CV STRESS RECOVERY BP: NORMAL MMHG
BH CV STRESS RECOVERY HR: 69 BPM
BH CV XLRA - TDI S': 10.2 CM/SEC
LV EF NUC BP: 77 %
MAXIMAL PREDICTED HEART RATE: 145 BPM
MAXIMAL PREDICTED HEART RATE: 145 BPM
PERCENT MAX PREDICTED HR: 87.59 %
SINUS: 2.9 CM
STRESS BASELINE BP: NORMAL MMHG
STRESS BASELINE HR: 53 BPM
STRESS PERCENT HR: 103 %
STRESS POST ESTIMATED WORKLOAD: 4.6 METS
STRESS POST EXERCISE DUR MIN: 3 MIN
STRESS POST EXERCISE DUR SEC: 11 SEC
STRESS POST PEAK BP: NORMAL MMHG
STRESS POST PEAK HR: 127 BPM
STRESS TARGET HR: 123 BPM
STRESS TARGET HR: 123 BPM

## 2023-01-24 PROCEDURE — A9500 TC99M SESTAMIBI: HCPCS | Performed by: INTERNAL MEDICINE

## 2023-01-24 PROCEDURE — 78452 HT MUSCLE IMAGE SPECT MULT: CPT | Performed by: INTERNAL MEDICINE

## 2023-01-24 PROCEDURE — 93017 CV STRESS TEST TRACING ONLY: CPT

## 2023-01-24 PROCEDURE — 93018 CV STRESS TEST I&R ONLY: CPT | Performed by: INTERNAL MEDICINE

## 2023-01-24 PROCEDURE — 93306 TTE W/DOPPLER COMPLETE: CPT | Performed by: INTERNAL MEDICINE

## 2023-01-24 PROCEDURE — 0 TECHNETIUM SESTAMIBI: Performed by: INTERNAL MEDICINE

## 2023-01-24 PROCEDURE — 93306 TTE W/DOPPLER COMPLETE: CPT

## 2023-01-24 PROCEDURE — 78452 HT MUSCLE IMAGE SPECT MULT: CPT

## 2023-01-24 RX ADMIN — TECHNETIUM TC 99M SESTAMIBI 1 DOSE: 1 INJECTION INTRAVENOUS at 07:58

## 2023-01-24 RX ADMIN — TECHNETIUM TC 99M SESTAMIBI 1 DOSE: 1 INJECTION INTRAVENOUS at 09:43

## 2023-01-26 ENCOUNTER — TELEPHONE (OUTPATIENT)
Dept: CARDIOLOGY | Facility: CLINIC | Age: 78
End: 2023-01-26
Payer: MEDICARE

## 2023-01-26 RX ORDER — METOPROLOL SUCCINATE 50 MG/1
50 TABLET, EXTENDED RELEASE ORAL DAILY
Qty: 90 TABLET | Refills: 3 | Status: SHIPPED | OUTPATIENT
Start: 2023-01-26 | End: 2023-02-07

## 2023-01-26 NOTE — TELEPHONE ENCOUNTER
Patient aware of recommendations.  She request Toprol script be sent to her [facility] mail order.  She is aware to start with Toprol XL 50 mg 1/2 tab to 1 tablet and monitor BP and HR.  She is aware to stop her Coreg when she receives the Toprol.  She is also aware if she has chest tightness despite the medication change to call our office.

## 2023-01-26 NOTE — TELEPHONE ENCOUNTER
----- Message from IZZY Morrison sent at 1/26/2023  7:35 AM EST -----  Based on Coreg dose, I would advise transition to Toprol XL 50 mg daily 1/2 to 1 tablet daily, monitoring BP and heart rate.  Imdur would help manage BP but her elevation was quite increased. Toprol may help better control with exertion.   If she continues to have chest tightness despite medication change then she may need heart cath based on her cardiac history.   Thank you    ----- Message -----  From: Rosales April SHELIA LOMELI  Sent: 1/25/2023   5:08 PM EST  To: IZZY Morrison    Patient aware of stress test results.  Regarding adding Toprol--She is taking Coreg 12.5 mg BID.  She also noted since she was not able to take her Imdur that AM due to having stress test, is that why her BP response was hypertensive?

## 2023-02-06 ENCOUNTER — TELEPHONE (OUTPATIENT)
Dept: CARDIOLOGY | Facility: CLINIC | Age: 78
End: 2023-02-06
Payer: MEDICARE

## 2023-02-07 NOTE — TELEPHONE ENCOUNTER
I spoke with patient about BP and HR. She is currently taking metoprolol succinate 50 mg daily and BP is running 135-150 systolic and 78-80 diastolic . She has asked if would be ok to take 1/2 tablet  twice daily ?

## 2023-02-07 NOTE — TELEPHONE ENCOUNTER
Patient aware of medication change and new prescription  of Metoprolol tartrate sent to [facility] pharmacy .

## 2024-01-24 ENCOUNTER — OFFICE VISIT (OUTPATIENT)
Dept: CARDIOLOGY | Facility: CLINIC | Age: 79
End: 2024-01-24
Payer: COMMERCIAL

## 2024-01-24 VITALS
HEIGHT: 64 IN | WEIGHT: 153.6 LBS | HEART RATE: 72 BPM | DIASTOLIC BLOOD PRESSURE: 70 MMHG | BODY MASS INDEX: 26.22 KG/M2 | SYSTOLIC BLOOD PRESSURE: 130 MMHG

## 2024-01-24 DIAGNOSIS — E78.2 MIXED HYPERLIPIDEMIA: ICD-10-CM

## 2024-01-24 DIAGNOSIS — I47.10 PSVT (PAROXYSMAL SUPRAVENTRICULAR TACHYCARDIA): ICD-10-CM

## 2024-01-24 DIAGNOSIS — I10 ESSENTIAL HYPERTENSION: ICD-10-CM

## 2024-01-24 DIAGNOSIS — I25.9 IHD (ISCHEMIC HEART DISEASE): Primary | ICD-10-CM

## 2024-01-24 PROCEDURE — 99214 OFFICE O/P EST MOD 30 MIN: CPT | Performed by: NURSE PRACTITIONER

## 2024-01-24 RX ORDER — NITROGLYCERIN 0.4 MG/1
0.4 TABLET SUBLINGUAL
Qty: 25 TABLET | Refills: 1 | Status: SHIPPED | OUTPATIENT
Start: 2024-01-24

## 2024-01-24 NOTE — PROGRESS NOTES
Chief Complaint   Patient presents with    Follow-up     Cardiac management    LABS     Has copy of current labs , results are in door    Med Refill     Requests new prescription for Nitroglycerin to [facility]       Subjective       Fidelina Bravo is a 78 y.o. female with HTN, hypercholesterolemia, and IHD diagnosed 2016, diagonal was stented.  Stress test 2020 was negative for ischemia, carotid ultrasound was negative for stenosis.  Later she fell causing a pelvis fracture and developed PE, took Eliquis for 3 months.  Cardiac monitor showed few PACs PVCs, 16 short SVT, longest 12 seconds.  Due to recurrence of syncope tilt table ordered and results negative.  In January 2023 nuclear stress test showed increased heart rate and BP response, hypoperfusion anterior wall most likely secondary to breast attenuation, small anterior wall infarct could not be ruled out.  Toprol-XL recommended.    Today she returns the office for follow-up visit.  She denies recent chest pain, palpitations, increase shortness of breath, dizziness or edema.  No recent change in medication management noted.    Cardiac History:    Past Surgical History:   Procedure Laterality Date    CARDIAC CATHETERIZATION  01/02/2016    Dr. Marks)- 99% D1- 2.25x8 JONI    CARDIOVASCULAR STRESS TEST  02/15/2012    4 min, 86% THR. BP- 186/82. Septal Ischemia    CARDIOVASCULAR STRESS TEST  12/17/2015    8 min 31 sec. 85% THR. Anterior Ischemia    CARDIOVASCULAR STRESS TEST  08/15/2018    7 Min. 20 Secs.9.0 METS. 88% THR. BP- 180/83. Small anterior Ischemia.    CARDIOVASCULAR STRESS TEST  06/25/2020    6 Min. 7.0 METS. 88% THR. BP- 180/84. EF 72% Negative.    CARDIOVASCULAR STRESS TEST  01/24/2023    3.11 Min. 4.6 METS. 87% THR. 205/85. EF > 70%. R/O Breast Attenuation    CONVERTED (HISTORICAL) HOLTER  11/02/2021    <7 Days. Avg 71. . 16 SVT. Longest 12.2 Secs    ECHO - CONVERTED  02/24/2012    Echo- EF 65%    ECHO - CONVERTED  12/17/2015    Echo- EF  65%. RVSP- 45 mmHg    ECHO - CONVERTED  01/01/2016    Echo- ([facility]. Dr. Marks) EF 65%. RVSP- 36 mmHg    ECHO - CONVERTED  08/15/2018    EF 65%. Mild MR & AI. RVSP- 37 mmHg.    ECHO - CONVERTED  06/25/2020    EF 65%. LA- 4.2 Cm. Mild MR & AI    ECHO - CONVERTED  01/24/2023    EF 65%. LA- 3.8. Trace-Mild MR & AI. RVSP- 33 mmHg    US CAROTID UNILATERAL  06/25/2020    Neg for flow limiting stenosis bilaterally       Current Outpatient Medications   Medication Sig Dispense Refill    acetaminophen (TYLENOL) 325 MG tablet Take 2 tablets by mouth Every 4 (Four) Hours As Needed for Mild Pain.      aspirin 81 MG EC tablet Take 1 tablet by mouth Daily.      atorvastatin (LIPITOR) 20 MG tablet Take 1 tablet by mouth Every Night.      cetirizine (ZyrTEC) 10 MG tablet Take 1 tablet by mouth Daily.      cholecalciferol (VITAMIN D3) 1000 UNITS tablet Take 1 tablet by mouth Daily.      isosorbide mononitrate (IMDUR) 30 MG 24 hr tablet Take 1 tablet by mouth Daily. 30 tablet 11    meloxicam (MOBIC) 15 MG tablet Take 1 tablet by mouth As Needed.      metoprolol tartrate (LOPRESSOR) 25 MG tablet TAKE 1 TABLET TWICE DAILY 180 tablet 3    Multiple Vitamin (MULTI VITAMIN DAILY PO) Take 1 tablet by mouth Daily.      nitroglycerin (NITROSTAT) 0.4 MG SL tablet Place 1 tablet under the tongue Every 5 (Five) Minutes As Needed for Chest Pain. Take no more than 3 doses in 15 minutes. 25 tablet 1    omeprazole (priLOSEC) 20 MG capsule Take 1 capsule by mouth Daily.       No current facility-administered medications for this visit.       Compazine [prochlorperazine edisylate], Codeine, Penicillins, and Sulfa antibiotics    Past Medical History:   Diagnosis Date    GERD (gastroesophageal reflux disease)     H/O colonoscopy with polypectomy     History of back surgery     History of breast biopsy     benign    Hypercholesteremia     Osteoporosis        Social History     Socioeconomic History    Marital status:    Tobacco Use    Smoking  "status: Never    Smokeless tobacco: Never   Vaping Use    Vaping Use: Never used   Substance and Sexual Activity    Alcohol use: No    Drug use: No       Family History   Problem Relation Age of Onset    Heart failure Mother     Atrial fibrillation Mother     Heart attack Maternal Grandfather     Heart attack Other     Heart attack Brother 68       Review of Systems   Cardiovascular: Negative.    Respiratory: Negative.     Gastrointestinal: Negative.    Neurological: Negative.    Psychiatric/Behavioral:  The patient is nervous/anxious (Has been currently receiving treatment for bladder cancer).         BP Readings from Last 5 Encounters:   01/24/24 130/70   07/11/23 130/74   12/21/22 110/60   06/16/22 120/76   12/28/21 126/88       Wt Readings from Last 5 Encounters:   01/24/24 69.7 kg (153 lb 9.6 oz)   07/11/23 67.9 kg (149 lb 9.6 oz)   01/24/23 67.2 kg (148 lb 2.4 oz)   12/21/22 67.2 kg (148 lb 3.2 oz)   06/16/22 66.7 kg (147 lb)       Objective     /70 (BP Location: Left arm, Patient Position: Sitting)   Pulse 72   Ht 162 cm (63.78\")   Wt 69.7 kg (153 lb 9.6 oz)   BMI 26.55 kg/m²     Vitals and nursing note reviewed.   Constitutional:       Appearance: Healthy appearance. Not in distress.   Eyes:      Conjunctiva/sclera: Conjunctivae normal.      Pupils: Pupils are equal, round, and reactive to light.   HENT:      Head: Normocephalic.   Pulmonary:      Effort: Pulmonary effort is normal.      Breath sounds: Normal breath sounds.   Cardiovascular:      PMI at left midclavicular line. Normal rate. Regular rhythm.   Pulses:     Intact distal pulses.   Edema:     Peripheral edema absent.   Abdominal:      General: Bowel sounds are normal.      Palpations: Abdomen is soft.   Musculoskeletal: Normal range of motion.      Cervical back: Normal range of motion and neck supple. Skin:     General: Skin is warm and dry.   Neurological:      Mental Status: Alert, oriented to person, place, and time and oriented to " person, place and time.          Procedures         Assessment & Plan   Diagnoses and all orders for this visit:    1. IHD (ischemic heart disease) (Primary)  -     nitroglycerin (NITROSTAT) 0.4 MG SL tablet; Place 1 tablet under the tongue Every 5 (Five) Minutes As Needed for Chest Pain. Take no more than 3 doses in 15 minutes.  Dispense: 25 tablet; Refill: 1    2. Essential hypertension    3. Mixed hyperlipidemia    4. PSVT (paroxysmal supraventricular tachycardia)        IHD  -January 2023 stress test and echocardiogram: Old infarct versus breast attenuation   - Anginal symptoms denied  -Continue statin, aspirin, Imdur  -Nitrostat prescription updated due to length of time     Hypertension  -BP stable  -Continue Lopressor 25 mg twice daily, Imdur 30 mg daily  -BMI normal  -DASH diet     Mixed hyperlipidemia  -Recent labs reviewed, lipids at goal  -Continue Lipitor 20 mg nightly, diet     PSVT  -Palpitations denied  -Heart rate and rhythm normal today  -Continue current dose Lopressor     6-month follow-up visit scheduled.

## 2024-06-17 NOTE — TELEPHONE ENCOUNTER
Blood pressures improved  Nasal cannula oxygen weaned off  Requesting patient weight check on standing scale  If weight stable, then okay for out patient follow up from cardiology standpoint.    Patient is aware of stress test and echo results and recommendations.  Add Imdur 30 mg once a day.  If symptoms persist, patient aware to call our office.

## 2024-07-25 ENCOUNTER — OFFICE VISIT (OUTPATIENT)
Dept: CARDIOLOGY | Facility: CLINIC | Age: 79
End: 2024-07-25
Payer: COMMERCIAL

## 2024-07-25 VITALS
DIASTOLIC BLOOD PRESSURE: 70 MMHG | WEIGHT: 152.2 LBS | HEART RATE: 70 BPM | SYSTOLIC BLOOD PRESSURE: 112 MMHG | BODY MASS INDEX: 25.99 KG/M2 | HEIGHT: 64 IN

## 2024-07-25 DIAGNOSIS — I10 ESSENTIAL HYPERTENSION: ICD-10-CM

## 2024-07-25 DIAGNOSIS — I47.10 PSVT (PAROXYSMAL SUPRAVENTRICULAR TACHYCARDIA): ICD-10-CM

## 2024-07-25 DIAGNOSIS — I25.9 IHD (ISCHEMIC HEART DISEASE): Primary | ICD-10-CM

## 2024-07-25 DIAGNOSIS — E78.2 MIXED HYPERLIPIDEMIA: ICD-10-CM

## 2024-07-25 PROCEDURE — 99214 OFFICE O/P EST MOD 30 MIN: CPT | Performed by: NURSE PRACTITIONER

## 2024-07-25 NOTE — PROGRESS NOTES
Chief Complaint   Patient presents with    Follow-up     Cardiac management.  Had 1 incident of chest pressure , she was out walking in heat and feels that is reason for pressure    LABS     June 2024  results in door    Med Refill     Requests  refills on metoprolol 30 day supply to [facility]       Subjective       Fidelina Bravo is a 78 y.o. female with HTN, hypercholesterolemia, and IHD diagnosed 2016, diagonal was stented.  Stress test 2020 was negative for ischemia, carotid ultrasound was negative for stenosis.  Later she fell causing a pelvis fracture and developed PE, took Eliquis for 3 months.  Cardiac monitor showed few PACs PVCs, 16 short SVT, longest 12 seconds.  Due to recurrence of syncope tilt table ordered and results negative.  In January 2023 nuclear stress test showed increased heart rate and BP response, hypoperfusion anterior wall most likely secondary to breast attenuation, small anterior wall infarct could not be ruled out.  Toprol-XL recommended.     Today she returns to the office for a follow-up visit.  She admits to 1 episode of chest pain while walking outside in hot weather.  The pain resolved with rest not requiring Nitrostat.  She denies recurrence since that time.  Her and her  have resumed walking approximately 2 miles daily and tolerating well.    Cardiac History:    Past Surgical History:   Procedure Laterality Date    CARDIAC CATHETERIZATION  01/02/2016    Dr. Marks)- 99% D1- 2.25x8 JONI    CARDIOVASCULAR STRESS TEST  02/15/2012    4 min, 86% THR. BP- 186/82. Septal Ischemia    CARDIOVASCULAR STRESS TEST  12/17/2015    8 min 31 sec. 85% THR. Anterior Ischemia    CARDIOVASCULAR STRESS TEST  08/15/2018    7 Min. 20 Secs.9.0 METS. 88% THR. BP- 180/83. Small anterior Ischemia.    CARDIOVASCULAR STRESS TEST  06/25/2020    6 Min. 7.0 METS. 88% THR. BP- 180/84. EF 72% Negative.    CARDIOVASCULAR STRESS TEST  01/24/2023    3.11 Min. 4.6 METS. 87% THR. 205/85. EF > 70%. R/O  Breast Attenuation    CONVERTED (HISTORICAL) HOLTER  11/02/2021    <7 Days. Avg 71. . 16 SVT. Longest 12.2 Secs    ECHO - CONVERTED  02/24/2012    Echo- EF 65%    ECHO - CONVERTED  12/17/2015    Echo- EF 65%. RVSP- 45 mmHg    ECHO - CONVERTED  01/01/2016    Echo- ([facility]. Dr. Marks) EF 65%. RVSP- 36 mmHg    ECHO - CONVERTED  08/15/2018    EF 65%. Mild MR & AI. RVSP- 37 mmHg.    ECHO - CONVERTED  06/25/2020    EF 65%. LA- 4.2 Cm. Mild MR & AI    ECHO - CONVERTED  01/24/2023    EF 65%. LA- 3.8. Trace-Mild MR & AI. RVSP- 33 mmHg    US CAROTID UNILATERAL  06/25/2020    Neg for flow limiting stenosis bilaterally       Current Outpatient Medications   Medication Sig Dispense Refill    acetaminophen (TYLENOL) 325 MG tablet Take 2 tablets by mouth Every 4 (Four) Hours As Needed for Mild Pain.      aspirin 81 MG EC tablet Take 1 tablet by mouth Daily.      atorvastatin (LIPITOR) 20 MG tablet Take 1 tablet by mouth Every Night.      cetirizine (ZyrTEC) 10 MG tablet Take 1 tablet by mouth Daily.      cholecalciferol (VITAMIN D3) 1000 UNITS tablet Take 1 tablet by mouth Daily.      isosorbide mononitrate (IMDUR) 30 MG 24 hr tablet Take 1 tablet by mouth Daily. 30 tablet 11    meloxicam (MOBIC) 15 MG tablet Take 1 tablet by mouth As Needed.      metoprolol tartrate (LOPRESSOR) 25 MG tablet Take 1 tablet by mouth 2 (Two) Times a Day. 180 tablet 3    Multiple Vitamin (MULTI VITAMIN DAILY PO) Take 1 tablet by mouth Daily.      nitroglycerin (NITROSTAT) 0.4 MG SL tablet Place 1 tablet under the tongue Every 5 (Five) Minutes As Needed for Chest Pain. Take no more than 3 doses in 15 minutes. 25 tablet 1    omeprazole (priLOSEC) 20 MG capsule Take 1 capsule by mouth Daily.       No current facility-administered medications for this visit.       Compazine [prochlorperazine edisylate], Codeine, Penicillins, and Sulfa antibiotics    Past Medical History:   Diagnosis Date    GERD (gastroesophageal reflux disease)     H/O colonoscopy  with polypectomy     History of back surgery     History of breast biopsy     benign    Hypercholesteremia     Osteoporosis        Social History     Socioeconomic History    Marital status:    Tobacco Use    Smoking status: Never    Smokeless tobacco: Never   Vaping Use    Vaping status: Never Used   Substance and Sexual Activity    Alcohol use: No    Drug use: No       Family History   Problem Relation Age of Onset    Heart failure Mother     Atrial fibrillation Mother     Heart attack Maternal Grandfather     Heart attack Other     Heart attack Brother 68       Review of Systems   Cardiovascular:  Positive for chest pain and leg swelling (Mild, not a new symptom). Negative for near-syncope and palpitations.   Respiratory:  Negative for shortness of breath.    Endocrine: Negative for polydipsia, polyphagia and polyuria.   Hematologic/Lymphatic: Negative for bleeding problem.   Skin:  Negative for color change.   Musculoskeletal:  Positive for back pain.   Psychiatric/Behavioral:  Negative for memory loss. The patient is not nervous/anxious.         BP Readings from Last 5 Encounters:   07/25/24 112/70   01/24/24 130/70   07/11/23 130/74   12/21/22 110/60   06/16/22 120/76       Wt Readings from Last 5 Encounters:   07/25/24 69 kg (152 lb 3.2 oz)   01/24/24 69.7 kg (153 lb 9.6 oz)   07/11/23 67.9 kg (149 lb 9.6 oz)   01/24/23 67.2 kg (148 lb 2.4 oz)   12/21/22 67.2 kg (148 lb 3.2 oz)       Objective     Labs 6/19/2024 per PCP: Glucose 97, BUN 21, creatinine 0.9, sodium 139, potassium 4.7, chloride 106, CO2 29, calcium 9.2, total protein 6.7, albumin 4.1, AST 37, ALP 21, total bili 1, GFR 69, ALT 29, total cholesterol 155, triglycerides 94, HDL 47, LDL 89, CBC unremarkable, vitamin D53.4, TSH 2.24    Labs 10/7/2023 per PCP: WBC 4.2, RBC 4.65, H&H 15.1 and 44, platelets 185, vitamin D 38.2, glucose 86, BUN 21, creatinine 0.99, GFR 59, sodium 139, potassium 4.8, calcium 9.5, AST 34, ALT 41, total cholesterol  "158, triglycerides 89, HDL 41,     /70 (BP Location: Left arm, Patient Position: Sitting)   Pulse 70   Ht 162 cm (63.78\")   Wt 69 kg (152 lb 3.2 oz)   BMI 26.31 kg/m²     Vitals and nursing note reviewed.   Constitutional:       Appearance: Healthy appearance. Not in distress.   Eyes:      Conjunctiva/sclera: Conjunctivae normal.      Pupils: Pupils are equal, round, and reactive to light.   HENT:      Head: Normocephalic.   Pulmonary:      Effort: Pulmonary effort is normal.      Breath sounds: Normal breath sounds.   Cardiovascular:      PMI at left midclavicular line. Normal rate. Regular rhythm.   Edema:     Peripheral edema absent.   Abdominal:      General: Bowel sounds are normal.      Palpations: Abdomen is soft.   Musculoskeletal:      Cervical back: Neck supple. Skin:     General: Skin is warm and dry.   Neurological:      Mental Status: Alert, oriented to person, place, and time and oriented to person, place and time.          Procedures: None today         Assessment & Plan   Diagnoses and all orders for this visit:    1. IHD (ischemic heart disease) (Primary)    2. Essential hypertension    3. Mixed hyperlipidemia    4. PSVT (paroxysmal supraventricular tachycardia)    Other orders  -     metoprolol tartrate (LOPRESSOR) 25 MG tablet; Take 1 tablet by mouth 2 (Two) Times a Day.  Dispense: 180 tablet; Refill: 3      IHD  -January 2023 stress test and echocardiogram: Old infarct versus breast attenuation   - Admits to 1 episode of chest pain.  -Continue statin, aspirin, Imdur, Nitrostat  -If recurrence of chest pain or other symptoms of concern patient understands to contact office and repeat cardiac workup will be advised     Hypertension  -BP stable  -Continue Lopressor 25 mg twice daily, Imdur 30 mg daily  -BMI normal  -DASH diet     Mixed hyperlipidemia  -Lipids at goal  -Continue Lipitor 20 mg nightly, diet     PSVT  -Palpitations denied  -Heart rate and rhythm normal today  -Continue " current dose Lopressor     6-month follow-up visit scheduled.                 Electronically signed by IZZY Powell,  July 25, 2024 17:38 EDT    Dictated Utilizing Dragon Dictation: Part of this note may be an electronic transcription/translation of spoken language to printed text using the Dragon Dictation System.

## 2025-02-12 ENCOUNTER — OFFICE VISIT (OUTPATIENT)
Dept: CARDIOLOGY | Facility: CLINIC | Age: 80
End: 2025-02-12
Payer: MEDICARE

## 2025-02-12 VITALS
BODY MASS INDEX: 26.32 KG/M2 | DIASTOLIC BLOOD PRESSURE: 80 MMHG | SYSTOLIC BLOOD PRESSURE: 120 MMHG | WEIGHT: 154.2 LBS | HEART RATE: 55 BPM | HEIGHT: 64 IN

## 2025-02-12 DIAGNOSIS — E78.2 MIXED HYPERLIPIDEMIA: ICD-10-CM

## 2025-02-12 DIAGNOSIS — R00.1 BRADYCARDIA, SINUS: ICD-10-CM

## 2025-02-12 DIAGNOSIS — I25.9 IHD (ISCHEMIC HEART DISEASE): Primary | ICD-10-CM

## 2025-02-12 DIAGNOSIS — I10 ESSENTIAL HYPERTENSION: ICD-10-CM

## 2025-02-12 DIAGNOSIS — I47.10 PSVT (PAROXYSMAL SUPRAVENTRICULAR TACHYCARDIA): ICD-10-CM

## 2025-02-12 PROCEDURE — 3079F DIAST BP 80-89 MM HG: CPT | Performed by: NURSE PRACTITIONER

## 2025-02-12 PROCEDURE — 3074F SYST BP LT 130 MM HG: CPT | Performed by: NURSE PRACTITIONER

## 2025-02-12 PROCEDURE — 93000 ELECTROCARDIOGRAM COMPLETE: CPT | Performed by: NURSE PRACTITIONER

## 2025-02-12 PROCEDURE — 99214 OFFICE O/P EST MOD 30 MIN: CPT | Performed by: NURSE PRACTITIONER

## 2025-02-12 NOTE — PROGRESS NOTES
Chief Complaint   Patient presents with    Follow-up     Cardiac management. Patient has an occasional twinge of chest discomfort, but is brief and patient states she has no concern with it .     Lab     Copy of current labs in door for review    Med Refill     No refills needed today       Subjective       Fidelina Brvao is a 79 y.o. female with HTN, hypercholesterolemia, and IHD diagnosed 2016, diagonal was stented. Stress test 2020 was negative for ischemia, carotid ultrasound was negative for stenosis. Later she fell causing a pelvis fracture and developed PE, took Eliquis for 3 months. Cardiac monitor showed few PACs PVCs, 16 short SVT, longest 12 seconds. Due to recurrence of syncope tilt table ordered and results negative. In January 2023 nuclear stress test showed increased heart rate and BP response, hypoperfusion anterior wall most likely secondary to breast attenuation, small anterior wall infarct could not be ruled out. Toprol-XL recommended.     Today she returns to the office for a follow-up visit.  Over the holiday she admits to not maintaining his healthier diet and less walking for exercise.  No cardiac symptoms of concern noted.  No recent change in medication management noted.  Blood pressure remained stable.    Cardiac History:    Past Surgical History:   Procedure Laterality Date    CARDIAC CATHETERIZATION  01/02/2016    Dr. Marks)- 99% D1- 2.25x8 JONI    CARDIOVASCULAR STRESS TEST  02/15/2012    4 min, 86% THR. BP- 186/82. Septal Ischemia    CARDIOVASCULAR STRESS TEST  12/17/2015    8 min 31 sec. 85% THR. Anterior Ischemia    CARDIOVASCULAR STRESS TEST  08/15/2018    7 Min. 20 Secs.9.0 METS. 88% THR. BP- 180/83. Small anterior Ischemia.    CARDIOVASCULAR STRESS TEST  06/25/2020    6 Min. 7.0 METS. 88% THR. BP- 180/84. EF 72% Negative.    CARDIOVASCULAR STRESS TEST  01/24/2023    3.11 Min. 4.6 METS. 87% THR. 205/85. EF > 70%. R/O Breast Attenuation    CONVERTED (HISTORICAL) HOLTER  11/02/2021     <7 Days. Avg 71. . 16 SVT. Longest 12.2 Secs    ECHO - CONVERTED  02/24/2012    Echo- EF 65%    ECHO - CONVERTED  12/17/2015    Echo- EF 65%. RVSP- 45 mmHg    ECHO - CONVERTED  01/01/2016    Echo- ([facility]. Dr. Marks) EF 65%. RVSP- 36 mmHg    ECHO - CONVERTED  08/15/2018    EF 65%. Mild MR & AI. RVSP- 37 mmHg.    ECHO - CONVERTED  06/25/2020    EF 65%. LA- 4.2 Cm. Mild MR & AI    ECHO - CONVERTED  01/24/2023    EF 65%. LA- 3.8. Trace-Mild MR & AI. RVSP- 33 mmHg    US CAROTID UNILATERAL  06/25/2020    Neg for flow limiting stenosis bilaterally       Current Outpatient Medications   Medication Sig Dispense Refill    acetaminophen (TYLENOL) 325 MG tablet Take 2 tablets by mouth Every 4 (Four) Hours As Needed for Mild Pain.      aspirin 81 MG EC tablet Take 1 tablet by mouth Daily.      atorvastatin (LIPITOR) 20 MG tablet Take 1 tablet by mouth Every Night.      cetirizine (ZyrTEC) 10 MG tablet Take 1 tablet by mouth Daily.      cholecalciferol (VITAMIN D3) 1000 UNITS tablet Take 1 tablet by mouth Daily.      isosorbide mononitrate (IMDUR) 30 MG 24 hr tablet Take 1 tablet by mouth Daily. 30 tablet 11    meloxicam (MOBIC) 15 MG tablet Take 1 tablet by mouth As Needed.      metoprolol tartrate (LOPRESSOR) 25 MG tablet Take 1 tablet by mouth 2 (Two) Times a Day. 180 tablet 3    Multiple Vitamin (MULTI VITAMIN DAILY PO) Take 1 tablet by mouth Daily.      nitroglycerin (NITROSTAT) 0.4 MG SL tablet Place 1 tablet under the tongue Every 5 (Five) Minutes As Needed for Chest Pain. Take no more than 3 doses in 15 minutes. 25 tablet 1    omeprazole (priLOSEC) 20 MG capsule Take 1 capsule by mouth Daily.       No current facility-administered medications for this visit.       Compazine [prochlorperazine edisylate], Codeine, Penicillins, and Sulfa antibiotics    Past Medical History:   Diagnosis Date    GERD (gastroesophageal reflux disease)     H/O colonoscopy with polypectomy     History of back surgery     History of  "breast biopsy     benign    Hypercholesteremia     Osteoporosis        Social History     Socioeconomic History    Marital status:    Tobacco Use    Smoking status: Never    Smokeless tobacco: Never   Vaping Use    Vaping status: Never Used   Substance and Sexual Activity    Alcohol use: No    Drug use: No       Family History   Problem Relation Age of Onset    Heart failure Mother     Atrial fibrillation Mother     Heart attack Maternal Grandfather     Heart attack Other     Heart attack Brother 68       Review of Systems   Constitutional: Positive for weight gain (2 pounds).   Cardiovascular:  Negative for chest pain, dyspnea on exertion, near-syncope and palpitations.   Respiratory:  Negative for shortness of breath and sleep disturbances due to breathing.    Hematologic/Lymphatic: Negative for bleeding problem. Does not bruise/bleed easily.   Skin:  Negative for color change.   Neurological:  Negative for dizziness, loss of balance and weakness.        BP Readings from Last 5 Encounters:   02/12/25 120/80   07/25/24 112/70   01/24/24 130/70   07/11/23 130/74   12/21/22 110/60       Wt Readings from Last 5 Encounters:   02/12/25 69.9 kg (154 lb 3.2 oz)   07/25/24 69 kg (152 lb 3.2 oz)   01/24/24 69.7 kg (153 lb 9.6 oz)   07/11/23 67.9 kg (149 lb 9.6 oz)   01/24/23 67.2 kg (148 lb 2.4 oz)       Objective     Labs 12/17/2024 per PCP: WBC 5.4, RBC 4.48, H&H 14.5 and 43.3, platelets 189, glucose 85, BUN 18, creatinine 0.9, sodium 137, testing 4.2, chloride 102, CO2 24, calcium 9.2, total protein 7.1, albumin 4.2, AST 33, ALP less than 20, total bili 0.6, GFR 70, ALT 27, , triglycerides 94    /80 (BP Location: Left arm, Patient Position: Sitting, Cuff Size: Adult)   Pulse 55   Ht 162 cm (63.78\")   Wt 69.9 kg (154 lb 3.2 oz)   BMI 26.65 kg/m²     Vitals and nursing note reviewed.   Constitutional:       Appearance: Healthy appearance. Well-groomed and not in distress.   Eyes:      " Conjunctiva/sclera: Conjunctivae normal.      Pupils: Pupils are equal, round, and reactive to light.   HENT:      Head: Normocephalic.   Pulmonary:      Effort: Pulmonary effort is normal.      Breath sounds: Normal breath sounds.   Cardiovascular:      PMI at left midclavicular line. Normal rate. Regular rhythm.      Murmurs: There is no murmur.   Edema:     Peripheral edema absent.   Abdominal:      General: Bowel sounds are normal.      Palpations: Abdomen is soft.   Musculoskeletal:      Cervical back: Neck supple. Skin:     General: Skin is warm and dry.   Neurological:      Mental Status: Alert, oriented to person, place, and time and oriented to person, place and time.      Gait: Gait is intact.   Psychiatric:         Mood and Affect: Mood normal.         Speech: Speech normal.         Behavior: Behavior is cooperative.            ECG 12 Lead    Date/Time: 2/12/2025 11:23 AM  Performed by: Ale Rebollar APRN    Authorized by: Ale Rebollar APRN  Comparison: compared with previous ECG from 11/12/2021  Similar to previous ECG  Rhythm: sinus bradycardia  BPM: 55               Assessment & Plan   Diagnoses and all orders for this visit:    1. IHD (ischemic heart disease) (Primary)    2. Essential hypertension    3. PSVT (paroxysmal supraventricular tachycardia)  -     ECG 12 Lead    4. Bradycardia, sinus  -     ECG 12 Lead    5. Mixed hyperlipidemia      IHD  -January 2023 stress test and echocardiogram: Old infarct versus breast attenuation   - Denies anginal symptoms  -Continue statin, aspirin, Imdur, Nitrostat     Hypertension/PSVT/sinus bradycardia  -BP stable.  EKG sinus bradycardia otherwise normal  -Palpitations denied  -Asymptomatic with bradycardia  -Continue Lopressor 25 mg twice daily, Imdur 30 mg daily  -DASH diet     Mixed hyperlipidemia  - Continue Lipitor  -Diet      6-month follow-up visit scheduled.               Electronically signed by IZZY Powell,  February 12, 2025 11:33  EST    Dictated Utilizing Dragon Dictation: Part of this note may be an electronic transcription/translation of spoken language to printed text using the Dragon Dictation System.